# Patient Record
Sex: MALE | Race: WHITE | NOT HISPANIC OR LATINO | URBAN - METROPOLITAN AREA
[De-identification: names, ages, dates, MRNs, and addresses within clinical notes are randomized per-mention and may not be internally consistent; named-entity substitution may affect disease eponyms.]

---

## 2018-02-27 ENCOUNTER — APPOINTMENT (RX ONLY)
Dept: URBAN - METROPOLITAN AREA CLINIC 349 | Facility: CLINIC | Age: 48
Setting detail: DERMATOLOGY
End: 2018-02-27

## 2021-05-05 PROBLEM — R07.2 PRECORDIAL PAIN: Chronic | Status: ACTIVE | Noted: 2021-05-05

## 2021-05-05 PROBLEM — Z82.49 FAMILY HISTORY OF PREMATURE CAD: Chronic | Status: ACTIVE | Noted: 2021-05-05

## 2021-05-05 PROBLEM — I10 ESSENTIAL HYPERTENSION: Status: ACTIVE | Noted: 2021-05-05

## 2021-05-17 ENCOUNTER — HOSPITAL ENCOUNTER (OUTPATIENT)
Dept: CT IMAGING | Age: 51
Discharge: HOME | End: 2021-05-17
Attending: INTERNAL MEDICINE
Payer: COMMERCIAL

## 2021-05-17 VITALS
DIASTOLIC BLOOD PRESSURE: 73 MMHG | HEART RATE: 83 BPM | OXYGEN SATURATION: 98 % | RESPIRATION RATE: 12 BRPM | SYSTOLIC BLOOD PRESSURE: 134 MMHG

## 2021-05-17 DIAGNOSIS — R07.2 PRECORDIAL PAIN: Chronic | ICD-10-CM

## 2021-05-17 DIAGNOSIS — I10 ESSENTIAL HYPERTENSION: ICD-10-CM

## 2021-05-17 DIAGNOSIS — Z82.49 FAMILY HISTORY OF PREMATURE CAD: Chronic | ICD-10-CM

## 2021-05-17 LAB — CREAT BLD-MCNC: 0.92 MG/DL (ref 0.8–1.5)

## 2021-05-17 PROCEDURE — 74011250637 HC RX REV CODE- 250/637: Performed by: PHYSICIAN ASSISTANT

## 2021-05-17 PROCEDURE — 74011000258 HC RX REV CODE- 258: Performed by: INTERNAL MEDICINE

## 2021-05-17 PROCEDURE — 74011000636 HC RX REV CODE- 636: Performed by: INTERNAL MEDICINE

## 2021-05-17 PROCEDURE — 82565 ASSAY OF CREATININE: CPT

## 2021-05-17 PROCEDURE — 75574 CT ANGIO HRT W/3D IMAGE: CPT

## 2021-05-17 RX ORDER — NITROGLYCERIN 0.4 MG/1
0.4 TABLET SUBLINGUAL ONCE
Status: COMPLETED | OUTPATIENT
Start: 2021-05-17 | End: 2021-05-17

## 2021-05-17 RX ORDER — SODIUM CHLORIDE 0.9 % (FLUSH) 0.9 %
10 SYRINGE (ML) INJECTION
Status: COMPLETED | OUTPATIENT
Start: 2021-05-17 | End: 2021-05-17

## 2021-05-17 RX ADMIN — IOPAMIDOL 75 ML: 755 INJECTION, SOLUTION INTRAVENOUS at 07:37

## 2021-05-17 RX ADMIN — NITROGLYCERIN 0.4 MG: 0.4 TABLET SUBLINGUAL at 07:46

## 2021-05-17 RX ADMIN — SODIUM CHLORIDE 100 ML: 900 INJECTION, SOLUTION INTRAVENOUS at 07:37

## 2021-05-17 RX ADMIN — Medication 10 ML: at 07:37

## 2021-05-25 PROCEDURE — 75574 CT ANGIO HRT W/3D IMAGE: CPT | Performed by: INTERNAL MEDICINE

## 2021-05-25 PROCEDURE — 75571 CT HRT W/O DYE W/CA TEST: CPT | Performed by: INTERNAL MEDICINE

## 2021-09-01 ENCOUNTER — HOSPITAL ENCOUNTER (OUTPATIENT)
Dept: LAB | Age: 51
Discharge: HOME | End: 2021-09-01
Payer: COMMERCIAL

## 2021-09-01 DIAGNOSIS — Z82.49 FAMILY HISTORY OF PREMATURE CAD: ICD-10-CM

## 2021-09-01 DIAGNOSIS — I10 ESSENTIAL HYPERTENSION: ICD-10-CM

## 2021-09-01 LAB
ALBUMIN SERPL-MCNC: 4.2 G/DL (ref 3.5–5)
ALBUMIN/GLOB SERPL: 1.4 {RATIO} (ref 1.2–3.5)
ALP SERPL-CCNC: 58 U/L (ref 50–136)
ALT SERPL-CCNC: 50 U/L (ref 12–65)
ANION GAP SERPL CALC-SCNC: 3 MMOL/L (ref 7–16)
AST SERPL-CCNC: 17 U/L (ref 15–37)
BILIRUB SERPL-MCNC: 0.8 MG/DL (ref 0.2–1.1)
BUN SERPL-MCNC: 10 MG/DL (ref 6–23)
CALCIUM SERPL-MCNC: 9.4 MG/DL (ref 8.3–10.4)
CHLORIDE SERPL-SCNC: 108 MMOL/L (ref 98–107)
CHOLEST SERPL-MCNC: 150 MG/DL
CO2 SERPL-SCNC: 27 MMOL/L (ref 21–32)
CREAT SERPL-MCNC: 0.89 MG/DL (ref 0.8–1.5)
GLOBULIN SER CALC-MCNC: 3.1 G/DL (ref 2.3–3.5)
GLUCOSE SERPL-MCNC: 110 MG/DL (ref 65–100)
HDLC SERPL-MCNC: 58 MG/DL (ref 40–60)
HDLC SERPL: 2.6 {RATIO}
LDLC SERPL CALC-MCNC: 67.4 MG/DL
POTASSIUM SERPL-SCNC: 4.6 MMOL/L (ref 3.5–5.1)
PROT SERPL-MCNC: 7.3 G/DL (ref 6.3–8.2)
SODIUM SERPL-SCNC: 138 MMOL/L (ref 138–145)
TRIGL SERPL-MCNC: 123 MG/DL (ref 35–150)
VLDLC SERPL CALC-MCNC: 24.6 MG/DL (ref 6–23)

## 2021-09-01 PROCEDURE — 80053 COMPREHEN METABOLIC PANEL: CPT

## 2021-09-01 PROCEDURE — 80061 LIPID PANEL: CPT

## 2021-09-01 PROCEDURE — 36415 COLL VENOUS BLD VENIPUNCTURE: CPT

## 2021-09-01 NOTE — PROGRESS NOTES
Patient laboratory information looks markedly better. Continue the good work. Blood sugar still remains mildly elevated. Continue to be aggressive with low carbohydrate diet exercise.

## 2022-01-03 ENCOUNTER — HOSPITAL ENCOUNTER (OUTPATIENT)
Age: 52
Setting detail: OBSERVATION
Discharge: HOME | End: 2022-01-04
Attending: EMERGENCY MEDICINE | Admitting: INTERNAL MEDICINE
Payer: COMMERCIAL

## 2022-01-03 ENCOUNTER — APPOINTMENT (OUTPATIENT)
Dept: GENERAL RADIOLOGY | Age: 52
End: 2022-01-03
Attending: EMERGENCY MEDICINE
Payer: COMMERCIAL

## 2022-01-03 DIAGNOSIS — R07.9 CHEST PAIN, UNSPECIFIED TYPE: Primary | ICD-10-CM

## 2022-01-03 DIAGNOSIS — I10 ESSENTIAL HYPERTENSION: ICD-10-CM

## 2022-01-03 DIAGNOSIS — Z82.49 FAMILY HISTORY OF PREMATURE CAD: Chronic | ICD-10-CM

## 2022-01-03 PROBLEM — E87.6 HYPOKALEMIA: Status: ACTIVE | Noted: 2022-01-03

## 2022-01-03 PROBLEM — Z72.0 SMOKELESS TOBACCO USE: Chronic | Status: ACTIVE | Noted: 2022-01-03

## 2022-01-03 LAB
ALBUMIN SERPL-MCNC: 4.2 G/DL (ref 3.5–5)
ALBUMIN/GLOB SERPL: 1.2 {RATIO} (ref 1.2–3.5)
ALP SERPL-CCNC: 66 U/L (ref 50–136)
ALT SERPL-CCNC: 54 U/L (ref 12–65)
ANION GAP SERPL CALC-SCNC: 9 MMOL/L (ref 7–16)
AST SERPL-CCNC: 29 U/L (ref 15–37)
ATRIAL RATE: 128 BPM
BASOPHILS # BLD: 0.1 K/UL (ref 0–0.2)
BASOPHILS NFR BLD: 1 % (ref 0–2)
BILIRUB SERPL-MCNC: 0.7 MG/DL (ref 0.2–1.1)
BUN SERPL-MCNC: 13 MG/DL (ref 6–23)
CALCIUM SERPL-MCNC: 9.6 MG/DL (ref 8.3–10.4)
CALCULATED P AXIS, ECG09: 71 DEGREES
CALCULATED R AXIS, ECG10: 167 DEGREES
CALCULATED T AXIS, ECG11: 66 DEGREES
CHLORIDE SERPL-SCNC: 105 MMOL/L (ref 98–107)
CO2 SERPL-SCNC: 26 MMOL/L (ref 21–32)
CREAT SERPL-MCNC: 1.13 MG/DL (ref 0.8–1.5)
DIAGNOSIS, 93000: NORMAL
DIFFERENTIAL METHOD BLD: ABNORMAL
EOSINOPHIL # BLD: 0.1 K/UL (ref 0–0.8)
EOSINOPHIL NFR BLD: 1 % (ref 0.5–7.8)
ERYTHROCYTE [DISTWIDTH] IN BLOOD BY AUTOMATED COUNT: 12.2 % (ref 11.9–14.6)
GLOBULIN SER CALC-MCNC: 3.5 G/DL (ref 2.3–3.5)
GLUCOSE SERPL-MCNC: 181 MG/DL (ref 65–100)
HCT VFR BLD AUTO: 44.8 % (ref 41.1–50.3)
HGB BLD-MCNC: 15.9 G/DL (ref 13.6–17.2)
IMM GRANULOCYTES # BLD AUTO: 0 K/UL (ref 0–0.5)
IMM GRANULOCYTES NFR BLD AUTO: 1 % (ref 0–5)
LIPASE SERPL-CCNC: 125 U/L (ref 73–393)
LYMPHOCYTES # BLD: 1.7 K/UL (ref 0.5–4.6)
LYMPHOCYTES NFR BLD: 27 % (ref 13–44)
MAGNESIUM SERPL-MCNC: 3.1 MG/DL (ref 1.8–2.4)
MCH RBC QN AUTO: 32.1 PG (ref 26.1–32.9)
MCHC RBC AUTO-ENTMCNC: 35.5 G/DL (ref 31.4–35)
MCV RBC AUTO: 90.5 FL (ref 79.6–97.8)
MONOCYTES # BLD: 0.6 K/UL (ref 0.1–1.3)
MONOCYTES NFR BLD: 10 % (ref 4–12)
NEUTS SEG # BLD: 3.8 K/UL (ref 1.7–8.2)
NEUTS SEG NFR BLD: 61 % (ref 43–78)
NRBC # BLD: 0 K/UL (ref 0–0.2)
P-R INTERVAL, ECG05: 164 MS
PLATELET # BLD AUTO: 174 K/UL (ref 150–450)
PMV BLD AUTO: 10.8 FL (ref 9.4–12.3)
POTASSIUM SERPL-SCNC: 3.2 MMOL/L (ref 3.5–5.1)
PROT SERPL-MCNC: 7.7 G/DL (ref 6.3–8.2)
Q-T INTERVAL, ECG07: 298 MS
QRS DURATION, ECG06: 98 MS
QTC CALCULATION (BEZET), ECG08: 435 MS
RBC # BLD AUTO: 4.95 M/UL (ref 4.23–5.6)
SODIUM SERPL-SCNC: 140 MMOL/L (ref 136–145)
TROPONIN-HIGH SENSITIVITY: 5.5 PG/ML (ref 0–14)
TROPONIN-HIGH SENSITIVITY: 6.4 PG/ML (ref 0–14)
VENTRICULAR RATE, ECG03: 128 BPM
WBC # BLD AUTO: 6.3 K/UL (ref 4.3–11.1)

## 2022-01-03 PROCEDURE — 99285 EMERGENCY DEPT VISIT HI MDM: CPT

## 2022-01-03 PROCEDURE — 83735 ASSAY OF MAGNESIUM: CPT

## 2022-01-03 PROCEDURE — 93005 ELECTROCARDIOGRAM TRACING: CPT | Performed by: EMERGENCY MEDICINE

## 2022-01-03 PROCEDURE — 99220 PR INITIAL OBSERVATION CARE/DAY 70 MINUTES: CPT | Performed by: INTERNAL MEDICINE

## 2022-01-03 PROCEDURE — 74011000250 HC RX REV CODE- 250: Performed by: PHYSICIAN ASSISTANT

## 2022-01-03 PROCEDURE — G0378 HOSPITAL OBSERVATION PER HR: HCPCS

## 2022-01-03 PROCEDURE — 84484 ASSAY OF TROPONIN QUANT: CPT

## 2022-01-03 PROCEDURE — 74011250637 HC RX REV CODE- 250/637: Performed by: PHYSICIAN ASSISTANT

## 2022-01-03 PROCEDURE — 80053 COMPREHEN METABOLIC PANEL: CPT

## 2022-01-03 PROCEDURE — 71046 X-RAY EXAM CHEST 2 VIEWS: CPT

## 2022-01-03 PROCEDURE — 74011250637 HC RX REV CODE- 250/637: Performed by: EMERGENCY MEDICINE

## 2022-01-03 PROCEDURE — 83690 ASSAY OF LIPASE: CPT

## 2022-01-03 PROCEDURE — 85025 COMPLETE CBC W/AUTO DIFF WBC: CPT

## 2022-01-03 RX ORDER — GUAIFENESIN 100 MG/5ML
81 LIQUID (ML) ORAL DAILY
Status: DISCONTINUED | OUTPATIENT
Start: 2022-01-04 | End: 2022-01-04 | Stop reason: SDUPTHER

## 2022-01-03 RX ORDER — SODIUM CHLORIDE 0.9 % (FLUSH) 0.9 %
5-40 SYRINGE (ML) INJECTION AS NEEDED
Status: DISCONTINUED | OUTPATIENT
Start: 2022-01-03 | End: 2022-01-04 | Stop reason: HOSPADM

## 2022-01-03 RX ORDER — ROSUVASTATIN CALCIUM 20 MG/1
40 TABLET, COATED ORAL
Status: DISCONTINUED | OUTPATIENT
Start: 2022-01-03 | End: 2022-01-04 | Stop reason: HOSPADM

## 2022-01-03 RX ORDER — ACETAMINOPHEN 325 MG/1
650 TABLET ORAL
Status: DISCONTINUED | OUTPATIENT
Start: 2022-01-03 | End: 2022-01-04 | Stop reason: HOSPADM

## 2022-01-03 RX ORDER — METOPROLOL TARTRATE 25 MG/1
25 TABLET, FILM COATED ORAL 2 TIMES DAILY
Status: DISCONTINUED | OUTPATIENT
Start: 2022-01-03 | End: 2022-01-04

## 2022-01-03 RX ORDER — SODIUM CHLORIDE 9 MG/ML
100 INJECTION, SOLUTION INTRAVENOUS CONTINUOUS
Status: DISCONTINUED | OUTPATIENT
Start: 2022-01-04 | End: 2022-01-04 | Stop reason: HOSPADM

## 2022-01-03 RX ORDER — LORAZEPAM 0.5 MG/1
0.5 TABLET ORAL
Status: DISCONTINUED | OUTPATIENT
Start: 2022-01-03 | End: 2022-01-04 | Stop reason: HOSPADM

## 2022-01-03 RX ORDER — SODIUM CHLORIDE 0.9 % (FLUSH) 0.9 %
5-40 SYRINGE (ML) INJECTION EVERY 8 HOURS
Status: DISCONTINUED | OUTPATIENT
Start: 2022-01-03 | End: 2022-01-04 | Stop reason: HOSPADM

## 2022-01-03 RX ORDER — NITROGLYCERIN 0.4 MG/1
0.4 TABLET SUBLINGUAL
Status: DISCONTINUED | OUTPATIENT
Start: 2022-01-03 | End: 2022-01-04 | Stop reason: HOSPADM

## 2022-01-03 RX ORDER — SODIUM CHLORIDE 0.9 % (FLUSH) 0.9 %
5-10 SYRINGE (ML) INJECTION EVERY 8 HOURS
Status: DISCONTINUED | OUTPATIENT
Start: 2022-01-03 | End: 2022-01-04 | Stop reason: HOSPADM

## 2022-01-03 RX ORDER — SODIUM CHLORIDE 0.9 % (FLUSH) 0.9 %
5-10 SYRINGE (ML) INJECTION AS NEEDED
Status: DISCONTINUED | OUTPATIENT
Start: 2022-01-03 | End: 2022-01-04 | Stop reason: HOSPADM

## 2022-01-03 RX ORDER — LISINOPRIL 5 MG/1
10 TABLET ORAL DAILY
Status: DISCONTINUED | OUTPATIENT
Start: 2022-01-04 | End: 2022-01-04 | Stop reason: HOSPADM

## 2022-01-03 RX ORDER — MORPHINE SULFATE 2 MG/ML
2 INJECTION, SOLUTION INTRAMUSCULAR; INTRAVENOUS
Status: DISCONTINUED | OUTPATIENT
Start: 2022-01-03 | End: 2022-01-04 | Stop reason: HOSPADM

## 2022-01-03 RX ORDER — GUAIFENESIN 100 MG/5ML
324 LIQUID (ML) ORAL
Status: COMPLETED | OUTPATIENT
Start: 2022-01-03 | End: 2022-01-03

## 2022-01-03 RX ORDER — ONDANSETRON 2 MG/ML
4 INJECTION INTRAMUSCULAR; INTRAVENOUS
Status: DISCONTINUED | OUTPATIENT
Start: 2022-01-03 | End: 2022-01-04 | Stop reason: HOSPADM

## 2022-01-03 RX ADMIN — SODIUM CHLORIDE, PRESERVATIVE FREE 5 ML: 5 INJECTION INTRAVENOUS at 16:23

## 2022-01-03 RX ADMIN — ASPIRIN 243 MG: 81 TABLET, CHEWABLE ORAL at 15:40

## 2022-01-03 RX ADMIN — ROSUVASTATIN CALCIUM 40 MG: 20 TABLET, FILM COATED ORAL at 22:47

## 2022-01-03 RX ADMIN — NITROGLYCERIN 1 INCH: 20 OINTMENT TOPICAL at 15:40

## 2022-01-03 NOTE — ED TRIAGE NOTES
Pt ambulatory to triage with sudden onset left sided CP reports nausea and radiating pain across chest and some nausea. Reports pain was \"hot pain\" reports cardiac hx \"of some blockages\" denies stents.  Follows with [facility] cardiology

## 2022-01-03 NOTE — H&P
[facility] Cardiology History & Physical      Date of  Admission: 1/3/2022  3:10 PM     Primary Care Physician: Dr. Halle Zelaya  Primary Cardiologist: Dr. Heidy Watson  Referring Physician: Dr. Leigha Odonnell  Admitting Physician: Dr. Yolis Valdez    CC: Chest pain    HPI:  Aaliyah Osuna is a 49 y.o. WM with h/o HTN and dyslipidemia who presented to ED with c/o acute onset of left-sided chest pain. He reports CP w/o radiation with associated nausea, heart racing, felt anxious and diaphoretic. Denies SOB, vomiting, LE swelling or syncope. Pt states he had a CT Ca score which was elevated and records show it was 379. Pt's father had CABG in early 63's and DF had MI at 53 yo. In the ED, BP elevated initially at 171/112, then repeat 150/99. HS troponin is negative at 5.5, CXR unremarkable and ECG showed ST with IC RBBB and no acute ST-T wave changes. Cardiology asked to evaluate for admission. Pt had NTG paste placed in ED and is feeling better w/o current CP. Non smoker but uses smokeless tobacco daily. History reviewed. No pertinent past medical history. Past Surgical History:   Procedure Laterality Date    HX OTHER SURGICAL      BIRTH BOBBY REMOVED ON NECK    HX WISDOM TEETH EXTRACTION         No Known Allergies   Social History     Socioeconomic History    Marital status:      Spouse name: Not on file    Number of children: Not on file    Years of education: Not on file    Highest education level: Not on file   Occupational History    Not on file   Tobacco Use    Smoking status: Never Smoker    Smokeless tobacco: Current User   Substance and Sexual Activity    Alcohol use:  Yes     Alcohol/week: 5.0 standard drinks     Types: 5 Cans of beer per week    Drug use: Not on file    Sexual activity: Not on file   Other Topics Concern    Not on file   Social History Narrative    Not on file     Social Determinants of Health     Financial Resource Strain:     Difficulty of Paying Living Expenses: Not on file   Food Insecurity:     Worried About Running Out of Food in the Last Year: Not on file    Uma of Food in the Last Year: Not on file   Transportation Needs:     Lack of Transportation (Medical): Not on file    Lack of Transportation (Non-Medical): Not on file   Physical Activity:     Days of Exercise per Week: Not on file    Minutes of Exercise per Session: Not on file   Stress:     Feeling of Stress : Not on file   Social Connections:     Frequency of Communication with Friends and Family: Not on file    Frequency of Social Gatherings with Friends and Family: Not on file    Attends Muslim Services: Not on file    Active Member of [address] [place] [address] [employer] or Organizations: Not on file    Attends Club or Organization Meetings: Not on file    Marital Status: Not on file   Intimate Partner Violence:     Fear of Current or Ex-Partner: Not on file    Emotionally Abused: Not on file    Physically Abused: Not on file    Sexually Abused: Not on file   Housing Stability:     Unable to Pay for Housing in the Last Year: Not on file    Number of [place] in the Last Year: Not on file    Unstable Housing in the Last Year: Not on file     Family History   Problem Relation Age of Onset    No Known Problems Mother     Diabetes Father     Heart Disease Father         Current Facility-Administered Medications   Medication Dose Route Frequency    sodium chloride (NS) flush 5-10 mL  5-10 mL IntraVENous Q8H    sodium chloride (NS) flush 5-10 mL  5-10 mL IntraVENous PRN     Current Outpatient Medications   Medication Sig    rosuvastatin (CRESTOR) 40 mg tablet Take 1 Tablet by mouth nightly.     lisinopril (PRINIVIL, ZESTRIL) 10 mg tablet TAKE 1 TABLET BY MOUTH DAILY       Review of symptoms:  General: no recent weight loss/gain, weakness, fatigue, fever or chills   Skin: no rashes, lumps, or other skin changes   HEENT: no headache, dizziness, lightheadedness, vision changes, hearing changes, tinnitus, vertigo, sinus pressure/pain, bleeding gums, sore throat, or hoarseness   Neck: no swollen glands, goiter, pain or stiffness   Respiratory: no cough, sputum, hemoptysis, dyspnea, wheezing   Cardiovascular: +chest pain or discomfort, +palpitations, no dyspnea, orthopnea, paroxysmal nocturnal dyspnea, peripheral edema   Gastrointestinal: no trouble swallowing, heartburn, change of appetite, +nausea, no change in bowel habits, pain with defecation, rectal bleeding or black/tarry stools, hemorrhoids, constipation, diarrhea, abdominal pain, jaundice, liver or gallbladder problems   Urinary: no frequency, urgency , hematuria, burning/pain with urination, recent flank pain, polyuria, nocturia, or difficulty urinating   Peripheral Vascular: no claudication, leg cramps, prior DVTs, swelling of calves, legs, or feet, color change, or swelling with redness or tenderness   Musculoskeletal: no muscle or joint pain/stiffness, joint swelling, erythema of joints, or back pain   Psychiatric: no depression, mental disorders, or excessive stress   Neurological: no history of CVA, dizziness, no sensory or motor loss, seizures, syncope, tremors, numbness, tingling, no changes in mood, attention, or speech, no changes in orientation, memory, insight, or judgment. no headache, vertigo. Hematologic: no anemia, easy bruising or bleeding   Endocrine: no diabetes, thyroid problems, heat or cold intolerance, excessive sweating, polyuria, polydipsia      Subjective:   Physical Exam  Visit Vitals  BP (!) 150/99   Pulse 89   Temp 98.1 °F (36.7 °C)   Resp 18   Ht 5' 10\" (1.778 m)   Wt 83.9 kg (185 lb)   SpO2 100%   BMI 26.54 kg/m²     General Appearance:  Well developed, well nourished, alert and oriented x 3, and individual in no acute distress. Ears/Nose/Mouth/Throat:   Hearing grossly normal.         Neck: Supple. Chest:   Lungs clear to auscultation bilaterally. Cardiovascular:  Regular rate and rhythm, S1, S2 normal, no murmur.    Abdomen:   Soft, non-tender, bowel sounds are active. Extremities: No edema bilaterally. Skin: Warm and dry. Labs:   Recent Results (from the past 24 hour(s))   EKG, 12 LEAD, INITIAL    Collection Time: 01/03/22  2:15 PM   Result Value Ref Range    Ventricular Rate 128 BPM    Atrial Rate 128 BPM    P-R Interval 164 ms    QRS Duration 98 ms    Q-T Interval 298 ms    QTC Calculation (Bezet) 435 ms    Calculated P Axis 71 degrees    Calculated R Axis 167 degrees    Calculated T Axis 66 degrees    Diagnosis       Sinus tachycardia with Premature supraventricular complexes  Possible Left atrial enlargement  Right axis deviation  Incomplete right bundle branch block  Cannot rule out Anterior infarct , age undetermined  Abnormal ECG  No previous ECGs available     TROPONIN-HIGH SENSITIVITY    Collection Time: 01/03/22  2:26 PM   Result Value Ref Range    Troponin-High Sensitivity 5.5 0 - 14 pg/mL   CBC WITH AUTOMATED DIFF    Collection Time: 01/03/22  2:26 PM   Result Value Ref Range    WBC 6.3 4.3 - 11.1 K/uL    RBC 4.95 4.23 - 5.6 M/uL    HGB 15.9 13.6 - 17.2 g/dL    HCT 44.8 41.1 - 50.3 %    MCV 90.5 79.6 - 97.8 FL    MCH 32.1 26.1 - 32.9 PG    MCHC 35.5 (H) 31.4 - 35.0 g/dL    RDW 12.2 11.9 - 14.6 %    PLATELET 697 998 - 530 K/uL    MPV 10.8 9.4 - 12.3 FL    ABSOLUTE NRBC 0.00 0.0 - 0.2 K/uL    DF AUTOMATED      NEUTROPHILS 61 43 - 78 %    LYMPHOCYTES 27 13 - 44 %    MONOCYTES 10 4.0 - 12.0 %    EOSINOPHILS 1 0.5 - 7.8 %    BASOPHILS 1 0.0 - 2.0 %    IMMATURE GRANULOCYTES 1 0.0 - 5.0 %    ABS. NEUTROPHILS 3.8 1.7 - 8.2 K/UL    ABS. LYMPHOCYTES 1.7 0.5 - 4.6 K/UL    ABS. MONOCYTES 0.6 0.1 - 1.3 K/UL    ABS. EOSINOPHILS 0.1 0.0 - 0.8 K/UL    ABS. BASOPHILS 0.1 0.0 - 0.2 K/UL    ABS. IMM.  GRANS. 0.0 0.0 - 0.5 K/UL   METABOLIC PANEL, COMPREHENSIVE    Collection Time: 01/03/22  2:26 PM   Result Value Ref Range    Sodium 140 136 - 145 mmol/L    Potassium 3.2 (L) 3.5 - 5.1 mmol/L    Chloride 105 98 - 107 mmol/L    CO2 26 21 - 32 mmol/L    Anion gap 9 7 - 16 mmol/L    Glucose 181 (H) 65 - 100 mg/dL    BUN 13 6 - 23 MG/DL    Creatinine 1.13 0.8 - 1.5 MG/DL    GFR est AA >60 >60 ml/min/1.73m2    GFR est non-AA >60 >60 ml/min/1.73m2    Calcium 9.6 8.3 - 10.4 MG/DL    Bilirubin, total 0.7 0.2 - 1.1 MG/DL    ALT (SGPT) 54 12 - 65 U/L    AST (SGOT) 29 15 - 37 U/L    Alk.  phosphatase 66 50 - 136 U/L    Protein, total 7.7 6.3 - 8.2 g/dL    Albumin 4.2 3.5 - 5.0 g/dL    Globulin 3.5 2.3 - 3.5 g/dL    A-G Ratio 1.2 1.2 - 3.5     LIPASE    Collection Time: 01/03/22  2:26 PM   Result Value Ref Range    Lipase 125 73 - 393 U/L   MAGNESIUM    Collection Time: 01/03/22  2:26 PM   Result Value Ref Range    Magnesium 3.1 (H) 1.8 - 2.4 mg/dL       Pt has been seen and examined by Dr. Jamil Grady and he agrees with the following assessment and plan:     Assessment/Plan:        Diagnosis    Chest pain with recent elevated Ca score 379- admit to telemetry, r/o MI with serial CE's x 3, ASA, add BB, continue home ACE-I and statin, add NTG paste, check echo and plan LHC in AM with Dr. Josette Bland, encouraged cessation of smokeless tobacco    Essential hypertension- uncontrolled, continue ACE-I, add BB, NTG paste    Dyslipidemia- statin    Hypokalemia- replace, recheck in AM    Family history of premature CAD       Radha Philip PA-C

## 2022-01-03 NOTE — ED PROVIDER NOTES
Patient is a 81-year-old male presenting to the emergency department today complaining of chest pain in the left chest which started 2 PM this afternoon. The patient said he was just working in his office when it started. He described the pain as a hot pain in the left chest without radiation. He says it is 10 out of 10 initially. He said that last for about 15 minutes and decreased to 7 out of 10 but is still present. Patient tells me that in June he had a CT which showed 60% blockage in the  maker. He has a family history of heart disease. He denies any tobacco or recreational drug use but drinks a glass of wine daily. The patient was short of breath with onset of his symptoms but no other associated symptoms noted. History reviewed. No pertinent past medical history. Past Surgical History:   Procedure Laterality Date    HX OTHER SURGICAL      BIRTH BOBBY REMOVED ON NECK    HX WISDOM TEETH EXTRACTION           Family History:   Problem Relation Age of Onset    No Known Problems Mother     Diabetes Father     Heart Disease Father        Social History     Socioeconomic History    Marital status:      Spouse name: Not on file    Number of children: Not on file    Years of education: Not on file    Highest education level: Not on file   Occupational History    Not on file   Tobacco Use    Smoking status: Never Smoker    Smokeless tobacco: Current User   Substance and Sexual Activity    Alcohol use:  Yes     Alcohol/week: 5.0 standard drinks     Types: 5 Cans of beer per week    Drug use: Not on file    Sexual activity: Not on file   Other Topics Concern    Not on file   Social History Narrative    Not on file     Social Determinants of Health     Financial Resource Strain:     Difficulty of Paying Living Expenses: Not on file   Food Insecurity:     Worried About Running Out of Food in the Last Year: Not on file    Uma of Food in the Last Year: Not on file Transportation Needs:     Lack of Transportation (Medical): Not on file    Lack of Transportation (Non-Medical): Not on file   Physical Activity:     Days of Exercise per Week: Not on file    Minutes of Exercise per Session: Not on file   Stress:     Feeling of Stress : Not on file   Social Connections:     Frequency of Communication with Friends and Family: Not on file    Frequency of Social Gatherings with Friends and Family: Not on file    Attends [employer] Services: Not on file    Active Member of [address] [place] [address] or Organizations: Not on file    Attends Club or Organization Meetings: Not on file    Marital Status: Not on file   Intimate Partner Violence:     Fear of Current or Ex-Partner: Not on file    Emotionally Abused: Not on file    Physically Abused: Not on file    Sexually Abused: Not on file   Housing Stability:     Unable to Pay for Housing in the Last Year: Not on file    Number of [place] in the Last Year: Not on file    Unstable Housing in the Last Year: Not on file         ALLERGIES: Patient has no known allergies. Review of Systems   Cardiovascular: Positive for chest pain. All other systems reviewed and are negative. Vitals:    01/03/22 1416 01/03/22 1510   BP: (!) 171/112 (!) 150/99   Pulse: (!) 119 89   Resp: 22 18   Temp: 98.1 °F (36.7 °C)    SpO2: 100% 100%   Weight: 83.9 kg (185 lb)    Height: 5' 10\" (1.778 m)             Physical Exam     GENERAL:The patient has Body mass index is 26.54 kg/m². Well-hydrated. VITAL SIGNS: Heart rate, blood pressure, respiratory rate reviewed as recorded in  nurse's notes  EYES: Pupils reactive. Extraocular motion intact. No conjunctival redness or drainage. NECK: Supple, no meningeal signs. Trachea midline. No masses or thyromegaly. LUNGS: Breath sounds clear and equal bilaterally no accessory muscle use. CHEST: No deformity  CARDIOVASCULAR: Regular rate and rhythm  ABDOMEN: Soft without tenderness.  No palpable masses or organomegaly. No  peritoneal signs. No rigidity. EXTREMITIES: No clubbing or cyanosis. No joint swelling. Normal muscle tone. No  restricted range of motion appreciated. NEUROLOGIC: Sensation is grossly intact. Cranial nerve exam reveals face is  symmetrical, tongue is midline speech is clear. SKIN: No rash or petechiae. Good skin turgor palpated. PSYCHIATRIC: Alert and oriented. Appropriate behavior and judgment. MDM  Number of Diagnoses or Management Options  Diagnosis management comments: CHF, COPD, pneumonia, PE,    MI, coronary artery disease, unstable angina, coronary artery disease,    Atrial fibrillation, cardiac arrhythmia, PVC, medication induced palpitations, heart block,  electrolyte induced palpitations,    Aortic dissection, aortic aneurysm,    GERD, musculoskeletal pain, costochondritis, rib fracture, pleurisy,         Amount and/or Complexity of Data Reviewed  Clinical lab tests: reviewed and ordered  Tests in the radiology section of CPT®: reviewed and ordered  Tests in the medicine section of CPT®: ordered and reviewed  Obtain history from someone other than the patient: yes  Review and summarize past medical records: yes  Discuss the patient with other providers: yes  Independent visualization of images, tracings, or specimens: yes      ED Course as of 01/03/22 1620   Mon Jan 03, 2022   1542 XR Chest PA LAT (check if patient is in hallway or waiting room)  IMPRESSION  No radiographic evidence of acute cardiopulmonary disease. [OI]   1510 Cardiology was consulted and came to the emergency department to evaluate the patient. They are planning on admitting him to the hospital and doing a heart catheterization tomorrow. [IT]   5953 I talked to the patient and his wife about the findings in the emergency department. Cardiology was consulted and came to see the patient.  [KH]      ED Course User Index  [KH] Amena Keller DO       EKG    Date/Time: 1/3/2022 3:38 PM  Performed by: Kevin Regalado DO KRYSTAL  Authorized by: Kayode Teran DO     ECG reviewed by ED Physician in the absence of a cardiologist: yes    Comments:      Sinus tachycardia rate of 128 bpm..  No ST elevation appreciated. EKG    Date/Time: 1/3/2022 4:20 PM  Performed by: Kayode Teran DO  Authorized by: Kayode Teran DO     ECG reviewed by ED Physician in the absence of a cardiologist: yes    Comments:      Normal sinus rhythm rate of 90 bpm with no ST elevation appreciated.

## 2022-01-04 ENCOUNTER — HOSPITAL ENCOUNTER (OUTPATIENT)
Dept: NON INVASIVE DIAGNOSTICS | Age: 52
Setting detail: OBSERVATION
Discharge: HOME | End: 2022-01-04
Attending: PHYSICIAN ASSISTANT
Payer: COMMERCIAL

## 2022-01-04 VITALS
HEART RATE: 83 BPM | RESPIRATION RATE: 15 BRPM | BODY MASS INDEX: 26.48 KG/M2 | HEIGHT: 70 IN | WEIGHT: 185 LBS | TEMPERATURE: 98.1 F | DIASTOLIC BLOOD PRESSURE: 83 MMHG | SYSTOLIC BLOOD PRESSURE: 130 MMHG | OXYGEN SATURATION: 99 %

## 2022-01-04 VITALS
BODY MASS INDEX: 26.48 KG/M2 | SYSTOLIC BLOOD PRESSURE: 150 MMHG | WEIGHT: 185 LBS | DIASTOLIC BLOOD PRESSURE: 99 MMHG | HEIGHT: 70 IN

## 2022-01-04 LAB
ACT BLD: 273 SECS (ref 70–128)
ANION GAP SERPL CALC-SCNC: 5 MMOL/L (ref 7–16)
BUN SERPL-MCNC: 12 MG/DL (ref 6–23)
CALCIUM SERPL-MCNC: 9 MG/DL (ref 8.3–10.4)
CHLORIDE SERPL-SCNC: 106 MMOL/L (ref 98–107)
CHOLEST SERPL-MCNC: 146 MG/DL
CO2 SERPL-SCNC: 27 MMOL/L (ref 21–32)
CREAT SERPL-MCNC: 0.96 MG/DL (ref 0.8–1.5)
ECHO AO ASC DIAM: 3 CM
ECHO AO ASCENDING AORTA INDEX: 1.49 CM/M2
ECHO AV AREA PEAK VELOCITY: 3.3 CM2
ECHO AV AREA VTI: 3.4 CM2
ECHO AV AREA/BSA PEAK VELOCITY: 1.6 CM2/M2
ECHO AV AREA/BSA VTI: 1.7 CM2/M2
ECHO AV CUSP MM: 2.2 CM
ECHO AV MEAN GRADIENT: 4 MMHG
ECHO AV MEAN VELOCITY: 0.9 M/S
ECHO AV PEAK GRADIENT: 8 MMHG
ECHO AV PEAK VELOCITY: 1.4 M/S
ECHO AV VELOCITY RATIO: 0.79
ECHO AV VTI: 27.4 CM
ECHO EST RA PRESSURE: 3 MMHG
ECHO LA AREA 2C: 15.7 CM2
ECHO LA AREA 4C: 18.6 CM2
ECHO LA DIAMETER INDEX: 1.49 CM/M2
ECHO LA DIAMETER: 3 CM
ECHO LA MAJOR AXIS: 5.4 CM
ECHO LA MINOR AXIS: 4.8 CM
ECHO LA VOL BP: 46 ML (ref 18–58)
ECHO LA VOL/BSA BIPLANE: 23 ML/M2 (ref 16–34)
ECHO LV E' LATERAL VELOCITY: 13 CM/S
ECHO LV E' SEPTAL VELOCITY: 9 CM/S
ECHO LV EDV A4C: 97 ML
ECHO LV EDV INDEX A4C: 48 ML/M2
ECHO LV EJECTION FRACTION A4C: 61 %
ECHO LV ESV A4C: 37 ML
ECHO LV ESV INDEX A4C: 18 ML/M2
ECHO LV FRACTIONAL SHORTENING: 31 % (ref 28–44)
ECHO LV INTERNAL DIMENSION DIASTOLE INDEX: 2.23 CM/M2
ECHO LV INTERNAL DIMENSION DIASTOLIC: 4.5 CM (ref 4.2–5.9)
ECHO LV INTERNAL DIMENSION SYSTOLIC INDEX: 1.53 CM/M2
ECHO LV INTERNAL DIMENSION SYSTOLIC: 3.1 CM
ECHO LV IVSD: 1 CM (ref 0.6–1)
ECHO LV MASS 2D: 153.3 G (ref 88–224)
ECHO LV MASS INDEX 2D: 75.9 G/M2 (ref 49–115)
ECHO LV POSTERIOR WALL DIASTOLIC: 1 CM (ref 0.6–1)
ECHO LV RELATIVE WALL THICKNESS RATIO: 0.44
ECHO LVOT AREA: 4.5 CM2
ECHO LVOT AV VTI INDEX: 0.77
ECHO LVOT DIAM: 2.4 CM
ECHO LVOT MEAN GRADIENT: 2 MMHG
ECHO LVOT PEAK GRADIENT: 5 MMHG
ECHO LVOT PEAK VELOCITY: 1.1 M/S
ECHO LVOT STROKE VOLUME INDEX: 47.5 ML/M2
ECHO LVOT SV: 95.9 ML
ECHO LVOT VTI: 21.2 CM
ECHO MV A VELOCITY: 0.66 M/S
ECHO MV AREA VTI: 4.1 CM2
ECHO MV E DECELERATION TIME (DT): 190 MS
ECHO MV E VELOCITY: 0.88 M/S
ECHO MV E/A RATIO: 1.33
ECHO MV E/E' LATERAL: 6.77
ECHO MV E/E' RATIO (AVERAGED): 8.27
ECHO MV E/E' SEPTAL: 9.78
ECHO MV LVOT VTI INDEX: 1.11
ECHO MV MAX VELOCITY: 1 M/S
ECHO MV MEAN GRADIENT: 2 MMHG
ECHO MV MEAN VELOCITY: 0.6 M/S
ECHO MV PEAK GRADIENT: 4 MMHG
ECHO MV VTI: 23.6 CM
ECHO RV INTERNAL DIMENSION: 2.6 CM
ECHO RV TAPSE: 2.5 CM (ref 1.5–2)
ERYTHROCYTE [DISTWIDTH] IN BLOOD BY AUTOMATED COUNT: 12.4 % (ref 11.9–14.6)
GLUCOSE SERPL-MCNC: 117 MG/DL (ref 65–100)
HCT VFR BLD AUTO: 40 % (ref 41.1–50.3)
HDLC SERPL-MCNC: 53 MG/DL (ref 40–60)
HDLC SERPL: 2.8 {RATIO}
HGB BLD-MCNC: 14 G/DL (ref 13.6–17.2)
LDLC SERPL CALC-MCNC: 66.6 MG/DL
MCH RBC QN AUTO: 32.5 PG (ref 26.1–32.9)
MCHC RBC AUTO-ENTMCNC: 35 G/DL (ref 31.4–35)
MCV RBC AUTO: 92.8 FL (ref 79.6–97.8)
NRBC # BLD: 0 K/UL (ref 0–0.2)
PLATELET # BLD AUTO: 132 K/UL (ref 150–450)
PMV BLD AUTO: 10.9 FL (ref 9.4–12.3)
POTASSIUM SERPL-SCNC: 4 MMOL/L (ref 3.5–5.1)
RBC # BLD AUTO: 4.31 M/UL (ref 4.23–5.6)
SODIUM SERPL-SCNC: 138 MMOL/L (ref 138–145)
TRIGL SERPL-MCNC: 132 MG/DL (ref 35–150)
TROPONIN-HIGH SENSITIVITY: 8.2 PG/ML (ref 0–14)
VLDLC SERPL CALC-MCNC: 26.4 MG/DL (ref 6–23)
WBC # BLD AUTO: 4.7 K/UL (ref 4.3–11.1)

## 2022-01-04 PROCEDURE — C8929 TTE W OR WO FOL WCON,DOPPLER: HCPCS

## 2022-01-04 PROCEDURE — 77030004558 HC CATH ANGI DX SUPR TORQ CARD -A: Performed by: INTERNAL MEDICINE

## 2022-01-04 PROCEDURE — 77030015766: Performed by: INTERNAL MEDICINE

## 2022-01-04 PROCEDURE — C1887 CATHETER, GUIDING: HCPCS | Performed by: INTERNAL MEDICINE

## 2022-01-04 PROCEDURE — 93458 L HRT ARTERY/VENTRICLE ANGIO: CPT | Performed by: INTERNAL MEDICINE

## 2022-01-04 PROCEDURE — 74011250636 HC RX REV CODE- 250/636: Performed by: PHYSICIAN ASSISTANT

## 2022-01-04 PROCEDURE — 80061 LIPID PANEL: CPT

## 2022-01-04 PROCEDURE — 74011000250 HC RX REV CODE- 250: Performed by: PHYSICIAN ASSISTANT

## 2022-01-04 PROCEDURE — C1769 GUIDE WIRE: HCPCS | Performed by: INTERNAL MEDICINE

## 2022-01-04 PROCEDURE — 99152 MOD SED SAME PHYS/QHP 5/>YRS: CPT | Performed by: INTERNAL MEDICINE

## 2022-01-04 PROCEDURE — 85027 COMPLETE CBC AUTOMATED: CPT

## 2022-01-04 PROCEDURE — 74011250637 HC RX REV CODE- 250/637: Performed by: PHYSICIAN ASSISTANT

## 2022-01-04 PROCEDURE — 99153 MOD SED SAME PHYS/QHP EA: CPT | Performed by: INTERNAL MEDICINE

## 2022-01-04 PROCEDURE — C1894 INTRO/SHEATH, NON-LASER: HCPCS | Performed by: INTERNAL MEDICINE

## 2022-01-04 PROCEDURE — 74011000636 HC RX REV CODE- 636: Performed by: INTERNAL MEDICINE

## 2022-01-04 PROCEDURE — 77030042317 HC BND COMPR HEMSTAT -B: Performed by: INTERNAL MEDICINE

## 2022-01-04 PROCEDURE — 80048 BASIC METABOLIC PNL TOTAL CA: CPT

## 2022-01-04 PROCEDURE — 99217 PR OBSERVATION CARE DISCHARGE MANAGEMENT: CPT | Performed by: INTERNAL MEDICINE

## 2022-01-04 PROCEDURE — 74011250636 HC RX REV CODE- 250/636: Performed by: INTERNAL MEDICINE

## 2022-01-04 PROCEDURE — 74011000250 HC RX REV CODE- 250: Performed by: INTERNAL MEDICINE

## 2022-01-04 PROCEDURE — 84484 ASSAY OF TROPONIN QUANT: CPT

## 2022-01-04 PROCEDURE — 85347 COAGULATION TIME ACTIVATED: CPT

## 2022-01-04 PROCEDURE — G0378 HOSPITAL OBSERVATION PER HR: HCPCS

## 2022-01-04 PROCEDURE — 77030012468 HC VLV BLEEDBK CNTRL ABBT -B: Performed by: INTERNAL MEDICINE

## 2022-01-04 PROCEDURE — 93571 IV DOP VEL&/PRESS C FLO 1ST: CPT | Performed by: INTERNAL MEDICINE

## 2022-01-04 RX ORDER — GUAIFENESIN 100 MG/5ML
81 LIQUID (ML) ORAL DAILY
Status: DISCONTINUED | OUTPATIENT
Start: 2022-01-04 | End: 2022-01-04

## 2022-01-04 RX ORDER — GUAIFENESIN 100 MG/5ML
81 LIQUID (ML) ORAL DAILY
Qty: 30 TABLET | Refills: 0 | Status: SHIPPED | COMMUNITY
Start: 2022-01-05

## 2022-01-04 RX ORDER — SODIUM CHLORIDE 0.9 % (FLUSH) 0.9 %
5-40 SYRINGE (ML) INJECTION EVERY 8 HOURS
Status: DISCONTINUED | OUTPATIENT
Start: 2022-01-04 | End: 2022-01-04 | Stop reason: HOSPADM

## 2022-01-04 RX ORDER — GUAIFENESIN 100 MG/5ML
81 LIQUID (ML) ORAL DAILY
Status: DISCONTINUED | OUTPATIENT
Start: 2022-01-05 | End: 2022-01-04 | Stop reason: HOSPADM

## 2022-01-04 RX ORDER — PANTOPRAZOLE SODIUM 40 MG/1
40 TABLET, DELAYED RELEASE ORAL DAILY
Qty: 90 TABLET | Refills: 3 | Status: SHIPPED | OUTPATIENT
Start: 2022-01-04

## 2022-01-04 RX ORDER — LIDOCAINE HYDROCHLORIDE 10 MG/ML
INJECTION INFILTRATION; PERINEURAL AS NEEDED
Status: DISCONTINUED | OUTPATIENT
Start: 2022-01-04 | End: 2022-01-04 | Stop reason: HOSPADM

## 2022-01-04 RX ORDER — SODIUM CHLORIDE 0.9 % (FLUSH) 0.9 %
5-40 SYRINGE (ML) INJECTION AS NEEDED
Status: DISCONTINUED | OUTPATIENT
Start: 2022-01-04 | End: 2022-01-04 | Stop reason: HOSPADM

## 2022-01-04 RX ORDER — HEPARIN SODIUM 10000 [USP'U]/ML
INJECTION, SOLUTION INTRAVENOUS; SUBCUTANEOUS AS NEEDED
Status: DISCONTINUED | OUTPATIENT
Start: 2022-01-04 | End: 2022-01-04 | Stop reason: HOSPADM

## 2022-01-04 RX ORDER — FENTANYL CITRATE 50 UG/ML
INJECTION, SOLUTION INTRAMUSCULAR; INTRAVENOUS AS NEEDED
Status: DISCONTINUED | OUTPATIENT
Start: 2022-01-04 | End: 2022-01-04 | Stop reason: HOSPADM

## 2022-01-04 RX ORDER — PANTOPRAZOLE SODIUM 40 MG/1
40 TABLET, DELAYED RELEASE ORAL
Status: DISCONTINUED | OUTPATIENT
Start: 2022-01-05 | End: 2022-01-04 | Stop reason: HOSPADM

## 2022-01-04 RX ORDER — HEPARIN SODIUM 200 [USP'U]/100ML
INJECTION, SOLUTION INTRAVENOUS
Status: COMPLETED | OUTPATIENT
Start: 2022-01-04 | End: 2022-01-04

## 2022-01-04 RX ORDER — MIDAZOLAM HYDROCHLORIDE 1 MG/ML
INJECTION, SOLUTION INTRAMUSCULAR; INTRAVENOUS AS NEEDED
Status: DISCONTINUED | OUTPATIENT
Start: 2022-01-04 | End: 2022-01-04 | Stop reason: HOSPADM

## 2022-01-04 RX ORDER — SODIUM CHLORIDE 9 MG/ML
250 INJECTION, SOLUTION INTRAVENOUS CONTINUOUS
Status: ACTIVE | OUTPATIENT
Start: 2022-01-04 | End: 2022-01-04

## 2022-01-04 RX ADMIN — SODIUM CHLORIDE 100 ML/HR: 900 INJECTION, SOLUTION INTRAVENOUS at 05:33

## 2022-01-04 RX ADMIN — ASPIRIN 81 MG: 81 TABLET, CHEWABLE ORAL at 09:09

## 2022-01-04 RX ADMIN — PERFLUTREN 2 ML: 6.52 INJECTION, SUSPENSION INTRAVENOUS at 08:25

## 2022-01-04 RX ADMIN — NITROGLYCERIN 1 INCH: 20 OINTMENT TOPICAL at 00:38

## 2022-01-04 RX ADMIN — NITROGLYCERIN 1 INCH: 20 OINTMENT TOPICAL at 06:28

## 2022-01-04 NOTE — DISCHARGE SUMMARY
[facility] Cardiology Discharge Summary     Patient ID:  Claudell Sewer  [id number]  49 y.o.  1970    Admit date: 1/3/2022    Discharge date:  1/4/2022    Admitting Physician: Samson Laird MD     Discharge Physician: Dr. Jewel Thao    Admission Diagnoses: Chest pain [R07.9]    Discharge Diagnoses:    Diagnosis    Unstable angina     Nonobstructive CAD    Hypokalemia    Essential hypertension     Family history of premature CAD    Dyslipidemia        Cardiology Procedures this admission:  Diagnostic left heart catheterization  EchoCardiogram  Consults: None    Hospital Course: Patient is a 49 y.o. WM with h/o HTN and dyslipidemia who presented to ED with c/o acute onset of left-sided chest pain. He reports CP w/o radiation with associated nausea, heart racing, felt anxious and diaphoretic. Denies SOB, vomiting, LE swelling or syncope. Pt states he had a CT Ca score which was elevated and records show it was 379. Pt's father had CABG in early 63's and DF had MI at 51 yo. In the ED, BP elevated initially at 171/112, then repeat 150/99. HS troponin is negative at 5.5, CXR unremarkable and ECG showed ST with IC RBBB and no acute ST-T wave changes. Cardiology asked to evaluate for admission. Pt had NTG paste placed in ED and is feeling better w/o current CP. Patient was admitted with unstable angina. Patient was started on BB. ECHO was performed and showed:   Left Ventricle Left ventricle size is normal. Normal wall thickness. Normal wall motion. Normal left ventricular systolic function with a visually estimated EF of 60 - 65%. Normal diastolic function. Left Atrium Left atrium size is normal.   Right Ventricle Right ventricle size is normal. Normal wall thickness. Normal systolic function. Right Atrium Right atrium size is normal.   Aortic Valve Valve structure is normal. No transvalvular regurgitation. No stenosis.    Mitral Valve Valve structure is normal. No transvalvular regurgitation. No stenosis. Tricuspid Valve Valve structure is normal. No transvalvular regurgitation. No stenosis. Unable to assess RVSP due to inadequate or insignificant tricuspid regurgitation. Pulmonic Valve Valve structure is normal. No transvalvular regurgitation. No stenosis. Aorta Normal sized annulus, sinus of Valsalva, ascending aorta, aortic arch, descending aorta and abdominal aorta. IVC/Hepatic Veins IVC diameter is less than or equal to 21 mm and decreases greater than 50% during inspiration; therefore the estimated right atrial pressure is normal (~3 mmHg). Pericardium No pericardial effusion. Patient underwent cardiac catheterization by Dr. Amarilys Borges. Patient was found to have mild to moderate nonobstructive proximal LAD stenosis with DFR 0.95-0.97, minimal coronary disease involving the left circumflex and right coronary arteries, and normal LV systolic function. Patient tolerated the procedure well and was taken to recovery. Later that afternoon, patient was up feeling well without any complaints of chest pain or shortness of breath. Patient's right radial cath site was clean, dry and intact without hematoma or bruit. Patient's labs were stable. Patient was seen and examined by Dr. Amarilys Borges and determined stable and ready for discharge. Patient was instructed on the importance of medication compliance and office follow up. For maximized medical therapy for CAD, patient will continue ACE-I, and high intensity statin as well. The patient will follow up with [facility] -- Dr. Amarilys Borges on Jan 26th at 10:30 am in [facility]. DISPOSITION: The patient is being discharged home in stable condition on a low saturated fat, low cholesterol and low salt diet. The patient is instructed to advance activities as tolerated to the limit of fatigue or shortness of breath. The patient is instructed to avoid all heavy lifting, straining, stooping or squatting for 3-5 days. The patient is instructed to watch the cath site for bleeding/oozing; if seen, the patient is instructed to apply firm pressure with a clean cloth and call [facility] at [phone]. The patient is instructed to watch for signs of infection which include: increasing area of redness, fever/hot to touch or purulent drainage at the catheterization site. The patient is instructed not to soak in a bathtub for 7-10 days, but is cleared to shower. The patient is instructed to call the office or return to the ER for immediate evaluation for any shortness of breath or chest pain not relieved by NTG. Discharge Exam: Patient has been seen by Dr. Donte Whiting: see his progress note for exam details.     Visit Vitals  /71   Pulse 83   Temp 98.1 °F (36.7 °C)   Resp 15   Ht 5' 10\" (1.778 m)   Wt 83.9 kg (185 lb)   SpO2 97%   BMI 26.54 kg/m²       Recent Results (from the past 24 hour(s))   EKG, 12 LEAD, INITIAL    Collection Time: 01/03/22  2:15 PM   Result Value Ref Range    Ventricular Rate 128 BPM    Atrial Rate 128 BPM    P-R Interval 164 ms    QRS Duration 98 ms    Q-T Interval 298 ms    QTC Calculation (Bezet) 435 ms    Calculated P Axis 71 degrees    Calculated R Axis 167 degrees    Calculated T Axis 66 degrees    Diagnosis       Sinus tachycardia with Premature supraventricular complexes  Possible Left atrial enlargement  Right axis deviation  Incomplete right bundle branch block  Poor R wave progression  Abnormal ECG  No previous ECGs available  Confirmed by ST LUIS NOGUEIRA MD (), IVANA TANG ([id number]) on 1/3/2022 5:08:15 PM     TROPONIN-HIGH SENSITIVITY    Collection Time: 01/03/22  2:26 PM   Result Value Ref Range    Troponin-High Sensitivity 5.5 0 - 14 pg/mL   CBC WITH AUTOMATED DIFF    Collection Time: 01/03/22  2:26 PM   Result Value Ref Range    WBC 6.3 4.3 - 11.1 K/uL    RBC 4.95 4.23 - 5.6 M/uL    HGB 15.9 13.6 - 17.2 g/dL    HCT 44.8 41.1 - 50.3 %    MCV 90.5 79.6 - 97.8 FL    MCH 32.1 26.1 - 32.9 PG    MCHC 35.5 (H) 31.4 - 35.0 g/dL    RDW 12.2 11.9 - 14.6 %    PLATELET 687 187 - 828 K/uL    MPV 10.8 9.4 - 12.3 FL    ABSOLUTE NRBC 0.00 0.0 - 0.2 K/uL    DF AUTOMATED      NEUTROPHILS 61 43 - 78 %    LYMPHOCYTES 27 13 - 44 %    MONOCYTES 10 4.0 - 12.0 %    EOSINOPHILS 1 0.5 - 7.8 %    BASOPHILS 1 0.0 - 2.0 %    IMMATURE GRANULOCYTES 1 0.0 - 5.0 %    ABS. NEUTROPHILS 3.8 1.7 - 8.2 K/UL    ABS. LYMPHOCYTES 1.7 0.5 - 4.6 K/UL    ABS. MONOCYTES 0.6 0.1 - 1.3 K/UL    ABS. EOSINOPHILS 0.1 0.0 - 0.8 K/UL    ABS. BASOPHILS 0.1 0.0 - 0.2 K/UL    ABS. IMM. GRANS. 0.0 0.0 - 0.5 K/UL   METABOLIC PANEL, COMPREHENSIVE    Collection Time: 01/03/22  2:26 PM   Result Value Ref Range    Sodium 140 136 - 145 mmol/L    Potassium 3.2 (L) 3.5 - 5.1 mmol/L    Chloride 105 98 - 107 mmol/L    CO2 26 21 - 32 mmol/L    Anion gap 9 7 - 16 mmol/L    Glucose 181 (H) 65 - 100 mg/dL    BUN 13 6 - 23 MG/DL    Creatinine 1.13 0.8 - 1.5 MG/DL    GFR est AA >60 >60 ml/min/1.73m2    GFR est non-AA >60 >60 ml/min/1.73m2    Calcium 9.6 8.3 - 10.4 MG/DL    Bilirubin, total 0.7 0.2 - 1.1 MG/DL    ALT (SGPT) 54 12 - 65 U/L    AST (SGOT) 29 15 - 37 U/L    Alk.  phosphatase 66 50 - 136 U/L    Protein, total 7.7 6.3 - 8.2 g/dL    Albumin 4.2 3.5 - 5.0 g/dL    Globulin 3.5 2.3 - 3.5 g/dL    A-G Ratio 1.2 1.2 - 3.5     LIPASE    Collection Time: 01/03/22  2:26 PM   Result Value Ref Range    Lipase 125 73 - 393 U/L   MAGNESIUM    Collection Time: 01/03/22  2:26 PM   Result Value Ref Range    Magnesium 3.1 (H) 1.8 - 2.4 mg/dL   TROPONIN-HIGH SENSITIVITY    Collection Time: 01/03/22  4:18 PM   Result Value Ref Range    Troponin-High Sensitivity 6.4 0 - 14 pg/mL   METABOLIC PANEL, BASIC    Collection Time: 01/04/22  5:11 AM   Result Value Ref Range    Sodium 138 138 - 145 mmol/L    Potassium 4.0 3.5 - 5.1 mmol/L    Chloride 106 98 - 107 mmol/L    CO2 27 21 - 32 mmol/L    Anion gap 5 (L) 7 - 16 mmol/L    Glucose 117 (H) 65 - 100 mg/dL    BUN 12 6 - 23 MG/DL    Creatinine 0. 96 0.8 - 1.5 MG/DL    GFR est AA >60 >60 ml/min/1.73m2    GFR est non-AA >60 >60 ml/min/1.73m2    Calcium 9.0 8.3 - 10.4 MG/DL   CBC W/O DIFF    Collection Time: 01/04/22  5:11 AM   Result Value Ref Range    WBC 4.7 4.3 - 11.1 K/uL    RBC 4.31 4.23 - 5.6 M/uL    HGB 14.0 13.6 - 17.2 g/dL    HCT 40.0 (L) 41.1 - 50.3 %    MCV 92.8 79.6 - 97.8 FL    MCH 32.5 26.1 - 32.9 PG    MCHC 35.0 31.4 - 35.0 g/dL    RDW 12.4 11.9 - 14.6 %    PLATELET 031 (L) 863 - 450 K/uL    MPV 10.9 9.4 - 12.3 FL    ABSOLUTE NRBC 0.00 0.0 - 0.2 K/uL   TROPONIN-HIGH SENSITIVITY    Collection Time: 01/04/22  5:11 AM   Result Value Ref Range    Troponin-High Sensitivity 8.2 0 - 14 pg/mL   LIPID PANEL    Collection Time: 01/04/22  5:11 AM   Result Value Ref Range    Cholesterol, total 146 <200 MG/DL    Triglyceride 132 35 - 150 MG/DL    HDL Cholesterol 53 40 - 60 MG/DL    LDL, calculated 66.6 <100 MG/DL    VLDL, calculated 26.4 (H) 6.0 - 23.0 MG/DL    CHOL/HDL Ratio 2.8     ECHO ADULT COMPLETE    Collection Time: 01/04/22  8:30 AM   Result Value Ref Range    LA Minor Axis 4.8 cm    LA Major Axis 5.4 cm    LA Area 2C 15.7 cm2    LA Area 4C 18.6 cm2    LA Volume BP 46 18 - 58 mL    LA Diameter 3.0 cm    LV EDV A4C 97 mL    LV ESV A4C 37 mL    IVSd 1.0 0.6 - 1.0 cm    LVIDd 4.5 4.2 - 5.9 cm    LVIDs 3.1 cm    LVOT Diameter 2.4 cm    LVOT Mean Gradient 2 mmHg    LVOT VTI 21.2 cm    LVOT Peak Velocity 1.1 m/s    LVOT Peak Gradient 5 mmHg    LVPWd 1.0 0.6 - 1.0 cm    LV E' Lateral Velocity 13 cm/s    LV E' Septal Velocity 9 cm/s    LV Ejection Fraction A4C 61 %    LVOT Area 4.5 cm2    LVOT SV 95.9 ml    AV Cusp Mmode 2.2 cm    AV Mean Velocity 0.9 m/s    AV Mean Gradient 4 mmHg    AV VTI 27.4 cm    AV Peak Velocity 1.4 m/s    AV Peak Gradient 8 mmHg    AV Area by VTI 3.4 cm2    AV Area by Peak Velocity 3.3 cm2    Ascending Aorta 3.0 cm    MV E Wave Deceleration Time 190.0 ms    MV A Velocity 0.66 m/s    MV E Velocity 0.88 m/s    MV Mean Velocity 0.6 m/s    MV Mean Gradient 2 mmHg    MV VTI 23.6 cm    MV Max Velocity 1.0 m/s    MV Peak Gradient 4 mmHg    MV Area by VTI 4.1 cm2    RVIDd 2.6 cm    TAPSE 2.5 (A) 1.5 - 2.0 cm    Fractional Shortening 2D 31 28 - 44 %    LV ESV Index A4C 18 mL/m2    LV EDV Index A4C 48 mL/m2    LVIDd Index 2.23 cm/m2    LVIDs Index 1.53 cm/m2    LV RWT Ratio 0.44     LV Mass 2D 153.3 88 - 224 g    LV Mass 2D Index 75.9 49 - 115 g/m2    MV E/A 1.33     E/E' Ratio (Averaged) 8.27     E/E' Lateral 6.77     E/E' Septal 9.78     LA Volume Index BP 23 16 - 34 ml/m2    LVOT Stroke Volume Index 47.5 mL/m2    LA Size Index 1.49 cm/m2    Ascending Aorta Index 1.49 cm/m2    AV Velocity Ratio 0.79     LVOT:AV VTI Index 0.77     MEHRDAD/BSA VTI 1.7 cm2/m2    MEHRDAD/BSA Peak Velocity 1.6 cm2/m2    MV:LVOT VTI Index 1.11     Est. RA Pressure 3 mmHg   POC ACTIVATED CLOTTING TIME    Collection Time: 01/04/22 10:06 AM   Result Value Ref Range    Activated Clotting Time (POC) 273 (H) 70 - 128 SECS       Patient Instructions:   Current Discharge Medication List      START taking these medications    Details   pantoprazole (Protonix) 40 mg tablet Take 1 Tablet by mouth daily. Qty: 90 Tablet, Refills: 3  Start date: 1/4/2022      aspirin 81 mg chewable tablet Take 1 Tablet by mouth daily. Qty: 30 Tablet, Refills: 0  Start date: 1/5/2022         CONTINUE these medications which have NOT CHANGED    Details   rosuvastatin (CRESTOR) 40 mg tablet Take 1 Tablet by mouth nightly.   Qty: 90 Tablet, Refills: 3      lisinopril (PRINIVIL, ZESTRIL) 10 mg tablet TAKE 1 TABLET BY MOUTH DAILY  Qty: 90 Tab, Refills: 3    Associated Diagnoses: Essential hypertension             Signed:  TELLO Bourne  1/4/2022  10:47 AM

## 2022-01-04 NOTE — PROGRESS NOTES
R band deflation completed. Riight radial dressed with 4x4 & tegaderm using sterile technique. No bleeding or hematoma.  Tolerated without difficulty

## 2022-01-04 NOTE — PROGRESS NOTES
Discharge instructions given per orders, voiced good understanding of Right radial care, medications & follow up care.  Denies any questions

## 2022-01-04 NOTE — PROGRESS NOTES
TRANSFER - OUT REPORT:    Verbal report given to Rn(name) on Juan José Braun.  being transferred to (unit) for routine progression of care       Report consisted of patients Situation, Background, Assessment and   Recommendations(SBAR). Information from the following report(s) SBAR was reviewed with the receiving nurse.     [facility] dre Zheng  RRA - TR band 12 mls  Diagnostic, medical management    6 mg Versed  100 mcg Fentanyl  10,000 units Heparin     @ [id number]

## 2022-01-04 NOTE — PROGRESS NOTES
Report received from [facility] [place] RN. Procedural finding communicated. Intra procedural medication administration reviewed. Progression of care discussed. Patient received into CPRU room 1, Post [facility] - Dr Nichole Moses    Access site without bleeding or swelling. Right Radial TR band in place (12 ml air)    Patient instructed to limit movement of right upper extremity. Routine post procedural vital signs & site assessment initiated.

## 2022-01-04 NOTE — DISCHARGE INSTRUCTIONS
HEART CATHETERIZATION/ANGIOGRAPHY DISCHARGE INSTRUCTIONS    1. Check puncture site frequently for swelling or bleeding. If there is any bleeding, apply pressure over the area with a clean towel or washcloth and call 911. Notify your doctor for any redness, swelling, drainage, or oozing from the puncture site. Notify your doctor for any fever or chills. 2. If the extremity becomes cold, numb, or painful call [facility] at [phone].  3. Activity should be limited for the next 48 hours. No heavy lifting, pushing, pulling  or strenuous activity for 48 hours. No heavy lifting (anything over 10 pounds) for 3 days. 4. You may resume your usual diet. Drink more fluids than usual.  5. Have a responsible person drive you home and stay with you for at least 24 hours after your heart catheterization/angiography. 6. You may remove bandage from your Right Arm in 24 hours. You may shower in 24 hours. No tub baths, hot tubs, or swimming for 1 week. Do not place any lotions, creams, powders, or ointments over puncture site for 1 week. You may place a clean band-aid over the puncture site each day for 5 days. Change daily. Sedation for a Medical Procedure: Care Instructions     You were given a sedative medication during your visit. While many of the effects will have worn   off before you leave; you may continue to feel some effects for several hours. Common side effects from sedation include:  · Feeling sleepy. (Your doctors and nurses will make sure you are not too sleepy to go home.)  · Nausea and vomiting. This usually does not last long. · Feeling tired. How can you care for yourself at home? Activity    · Don't do anything for 24 hours that requires attention to detail. It takes time for the medicine effects to completely wear off. · Do not make important legal decisions for 24 hours. · Do not sign any legal documents for 24 hours.      · Do not drink alcohol today     · For your safety, you should not drive or operate heavy machinery for the remainder of the day     · Rest when you feel tired. Getting enough sleep will help you recover. Diet    · You can eat your normal diet, unless your doctor gives you other instructions. If your stomach is upset, try clear liquids and bland, low-fat foods like plain toast or rice. · Drink plenty of fluids (unless your doctor tells you not to). · Don't drink alcohol for 24 hours. Medicines    · Be safe with medicines. Read and follow all instructions on the label. · If the doctor gave you a prescription medicine for pain, take it as prescribed. · If you are not taking a prescription pain medicine, ask your doctor if you can take an over-the-counter medicine. · If you think your pain medicine is making you sick to your stomach:  · Take your medicine after meals (unless your doctor has told you not to). · Ask your doctor for a different pain medicine. I have read the above instructions and have had the opportunity to ask questions. Patient: ________________________   Date: _____________    Witness: _______________________   Date: _____________    Coronary Artery Disease: Care Instructions  Your Care Instructions     The heart is a muscle, and like any muscle, it needs blood to work well. Coronary artery disease occurs when the arteries that bring oxygen-rich blood to your heart have a buildup of plaque--deposits of fats and other substances. Plaque can reduce blood flow to the heart muscle. This can cause angina symptoms such as chest pain or pressure. A heart attack can happen if blood flow is completely blocked. You can do a lot to improve your health and prevent a heart attack. Eating healthy food, not smoking, getting regular exercise, and taking your medicine are the main things you can do every day to stay healthy. Follow-up care is a key part of your treatment and safety.  Be sure to make and go to all appointments, and call your doctor if you are having problems. It's also a good idea to know your test results and keep a list of the medicines you take. How can you care for yourself at home? Medicines    · Be safe with medicines. Take your medicines exactly as prescribed. Call your doctor if you think you are having a problem with your medicine. You will get more details on the specific medicines your doctor prescribes.     · You will take medicines that lower your risk of a heart attack and lower your risk of dying early from heart disease. These medicines include:  ? Angiotensin-converting enzyme (ACE) inhibitors or angiotensin II receptor blockers (ARBs). They lower blood pressure. ? Aspirin and other blood thinners. They prevent blood clots that could cause a heart attack. ? Beta-blockers. They lower the heart's workload. ? Statins and other cholesterol medicines. They lower cholesterol.     · If your doctor has given you nitroglycerin for angina symptoms (such as chest pain or pressure) keep it with you at all times. If you have symptoms, sit down and rest, and take the first dose of nitroglycerin as directed. If your symptoms get worse or are not getting better within 5 minutes, call 911 right away. Stay on the phone. The emergency  will give you further instructions.     · Do not take any over-the-counter medicines, vitamins, or herbal products without talking to your doctor first.   Lifestyle  Ask your doctor if a cardiac rehab program is right for you. Cardiac rehab can help you make lifestyle changes. In cardiac rehab, a team of health professionals provides education and support to help you make new, healthy habits.    · Do not smoke. Avoid secondhand smoke too. Smoking can increase your risk of a heart attack or stroke. If you need help quitting, talk to your doctor about stop-smoking programs and medicines. These can increase your chances of quitting for good.     · Eat heart-healthy foods.  These include vegetables, fruits, nuts, beans, lean meat, fish, and whole grains. Limit saturated fat, sodium, and alcohol. Limit drinks and foods with added sugar.     · If your doctor recommends it, get more exercise. Ask your doctor what level of exercise is safe for you. Walking is a good choice. Bit by bit, increase the amount you walk every day. Try for at least 30 minutes on most days of the week. You also may want to swim, bike, or do other activities.     · Stay at a healthy weight. Lose weight if you need to.     · Manage other health problems. These include diabetes, high blood pressure, and high cholesterol. If you think you may have a problem with alcohol or drug use, talk to your doctor.     · If you have angina symptoms, pay attention to your symptoms. This can help you see what causes them and what is typical for you.     · Avoid colds and flu. Get a pneumococcal vaccine shot. If you have had one before, ask your doctor whether you need another dose. Get a flu vaccine every year. If you must be around people with colds or flu, wash your hands often.     · If you think you have symptoms of depression, talk to your doctor. Symptoms include feeling sad or hopeless most of the time, or losing interest in activities that used to make you happy. When should you call for help? Call 911 anytime you think you may need emergency care. For example, call if:    · You have symptoms of a heart attack. These may include:  ? Chest pain or pressure, or a strange feeling in the chest.  ? Sweating. ? Shortness of breath. ? Nausea or vomiting. ? Pain, pressure, or a strange feeling in the back, neck, jaw, or upper belly or in one or both shoulders or arms. ? Lightheadedness or sudden weakness. ? A fast or irregular heartbeat. After you call 911, the  may tell you to chew 1 adult-strength or 2 to 4 low-dose aspirin. Wait for an ambulance.  Do not try to drive yourself.     · You have angina symptoms (such as chest pain or pressure) that do not go away with rest or are not getting better within 5 minutes after you take a dose of nitroglycerin.     · You passed out (lost consciousness). Call your doctor now or seek immediate medical care if:    · You are having angina symptoms, such as chest pain or pressure, more often than usual, or they are different or worse than usual.     · You have new or increased shortness of breath.     · You are dizzy or lightheaded, or you feel like you may faint. Watch closely for changes in your health, and be sure to contact your doctor if you have any problems. Where can you learn more? Go to http://www.gray.com/  Enter A110 in the search box to learn more about \"Coronary Artery Disease: Care Instructions. \"  Current as of: April 29, 2021               Content Version: 13.0  © 1766-3508 [employer]. Care instructions adapted under license by [employer] (which disclaims liability or warranty for this information). If you have questions about a medical condition or this instruction, always ask your healthcare professional. [address] any warranty or liability for your use of this information.

## 2022-02-04 PROBLEM — E78.5 DYSLIPIDEMIA: Chronic | Status: ACTIVE | Noted: 2022-02-04

## 2022-02-04 PROBLEM — I25.10 CORONARY ARTERY DISEASE INVOLVING NATIVE CORONARY ARTERY OF NATIVE HEART WITHOUT ANGINA PECTORIS: Chronic | Status: ACTIVE | Noted: 2022-02-04

## 2022-03-18 PROBLEM — E78.5 DYSLIPIDEMIA: Status: ACTIVE | Noted: 2022-02-04

## 2022-03-18 PROBLEM — R07.2 PRECORDIAL PAIN: Status: ACTIVE | Noted: 2021-05-05

## 2022-03-18 PROBLEM — R07.9 CHEST PAIN: Status: ACTIVE | Noted: 2022-01-03

## 2022-03-19 PROBLEM — I10 ESSENTIAL HYPERTENSION: Status: ACTIVE | Noted: 2021-05-05

## 2022-03-19 PROBLEM — Z82.49 FAMILY HISTORY OF PREMATURE CAD: Status: ACTIVE | Noted: 2021-05-05

## 2022-03-19 PROBLEM — I25.10 CORONARY ARTERY DISEASE INVOLVING NATIVE CORONARY ARTERY OF NATIVE HEART WITHOUT ANGINA PECTORIS: Status: ACTIVE | Noted: 2022-02-04

## 2022-03-20 PROBLEM — E87.6 HYPOKALEMIA: Status: ACTIVE | Noted: 2022-01-03

## 2022-03-20 PROBLEM — Z72.0 SMOKELESS TOBACCO USE: Status: ACTIVE | Noted: 2022-01-03

## 2022-08-04 ENCOUNTER — PATIENT MESSAGE (OUTPATIENT)
Dept: CARDIOLOGY CLINIC | Age: 52
End: 2022-08-04

## 2022-09-07 ENCOUNTER — OFFICE VISIT (OUTPATIENT)
Dept: CARDIOLOGY CLINIC | Age: 52
End: 2022-09-07
Payer: COMMERCIAL

## 2022-09-07 VITALS
HEART RATE: 80 BPM | BODY MASS INDEX: 27.43 KG/M2 | HEIGHT: 70 IN | WEIGHT: 191.6 LBS | SYSTOLIC BLOOD PRESSURE: 118 MMHG | DIASTOLIC BLOOD PRESSURE: 70 MMHG

## 2022-09-07 DIAGNOSIS — I25.10 CORONARY ARTERY DISEASE INVOLVING NATIVE CORONARY ARTERY OF NATIVE HEART WITHOUT ANGINA PECTORIS: Primary | ICD-10-CM

## 2022-09-07 DIAGNOSIS — E78.5 DYSLIPIDEMIA: ICD-10-CM

## 2022-09-07 DIAGNOSIS — I10 ESSENTIAL HYPERTENSION: ICD-10-CM

## 2022-09-07 DIAGNOSIS — N52.2 DRUG-INDUCED ERECTILE DYSFUNCTION: Chronic | ICD-10-CM

## 2022-09-07 PROCEDURE — 99214 OFFICE O/P EST MOD 30 MIN: CPT | Performed by: INTERNAL MEDICINE

## 2022-09-07 ASSESSMENT — ENCOUNTER SYMPTOMS
COUGH: 0
SHORTNESS OF BREATH: 0
BACK PAIN: 0
ABDOMINAL PAIN: 0

## 2022-09-07 NOTE — PROGRESS NOTES
[facility] CARDIOLOGY  [address] [facility], [address]      22      NAME:  Alex Robles. : 1970  MRN: [id number]      SUBJECTIVE:   Alex Robles. is a 49 y.o. male seen for a follow up visit regarding the following:     Chief Complaint   Patient presents with    Coronary Artery Disease    Hypertension       HPI:   male with history of hypertension and strong premature family history presents with intermittent chest discomfort pain  associated with exertion also occurs at rest.  Baseline EKG with sinus rhythm incomplete right bundle branch block no ST-T wave changes. Now with a history of worsening hypertension moderately elevated lipids. Now was in the ER with CP and cardiac catheterization  with moderate non-obstructive CAD. Past Medical History, Past Surgical History, Family history, Social History, and Medications were all reviewed with the patient today and updated as necessary. Current Outpatient Medications   Medication Sig Dispense Refill    aspirin 81 MG chewable tablet Take 81 mg by mouth daily      lisinopril (PRINIVIL;ZESTRIL) 10 MG tablet TAKE 1 TABLET BY MOUTH DAILY      pantoprazole (PROTONIX) 40 MG tablet Take 40 mg by mouth daily      rosuvastatin (CRESTOR) 40 MG tablet Take 40 mg by mouth      sildenafil (VIAGRA) 100 MG tablet Take 100 mg by mouth daily as needed       No current facility-administered medications for this visit.      Patient Active Problem List    Diagnosis Date Noted    Drug-induced erectile dysfunction 2022     Priority: Medium    Dyslipidemia 2022    Coronary artery disease involving native coronary artery of native heart without angina pectoris 2022:  Mild/moderate CAD DFR mLAD 0.96      Chest pain 2022    Hypokalemia 2022    Smokeless tobacco use 2022    Precordial pain 2021    Family history of premature CAD 2021    Essential hypertension 2021 Family History   Problem Relation Age of Onset    Heart Disease Father     Diabetes Father     No Known Problems Mother      Social History     Tobacco Use    Smoking status: Never    Smokeless tobacco: Current   Substance Use Topics    Alcohol use: Yes     Alcohol/week: 5.0 standard drinks       Review Of Symptoms    Review of Systems   Constitutional: Negative for fever and malaise/fatigue. HENT:  Negative for nosebleeds. Cardiovascular:  Negative for chest pain, dyspnea on exertion and palpitations. Respiratory:  Negative for cough and shortness of breath. Musculoskeletal:  Negative for back pain, muscle cramps, muscle weakness and myalgias. Gastrointestinal:  Negative for abdominal pain. Neurological:  Negative for dizziness. Physical Exam  Blood pressure 118/70, pulse 80, height 5' 10\" (1.778 m), weight 191 lb 9.6 oz (86.9 kg). Physical Exam  HENT:      Head: Normocephalic and atraumatic. Eyes:      Extraocular Movements: Extraocular movements intact. Cardiovascular:      Rate and Rhythm: Normal rate and regular rhythm. Heart sounds: No murmur heard. Pulmonary:      Effort: Pulmonary effort is normal.      Breath sounds: Normal breath sounds. Abdominal:      Palpations: Abdomen is soft. Musculoskeletal:         General: Normal range of motion. Skin:     General: Skin is warm and dry. Neurological:      General: No focal deficit present. Mental Status: He is oriented to person, place, and time. Medical problems, medical history and test results were reviewed with the patient today.       Lab Results   Component Value Date    CHOL 146 01/04/2022    CHOL 150 09/01/2021     Lab Results   Component Value Date    TRIG 132 01/04/2022    TRIG 123 09/01/2021     Lab Results   Component Value Date    HDL 53 01/04/2022    HDL 58 09/01/2021     Lab Results   Component Value Date    LDLCALC 66.6 01/04/2022    LDLCALC 67.4 09/01/2021     Lab Results   Component Value Date LABVLDL 26.4 (H) 01/04/2022    LABVLDL 24.6 (H) 09/01/2021     Lab Results   Component Value Date    CHOLHDLRATIO 2.8 01/04/2022    CHOLHDLRATIO 2.6 09/01/2021        No results found for: LABA1C  No results found for: EAG     Lab Results   Component Value Date     01/04/2022    K 4.0 01/04/2022     01/04/2022    CO2 27 01/04/2022    BUN 12 01/04/2022    CREATININE 0.96 01/04/2022    GLUCOSE 117 (H) 01/04/2022    CALCIUM 9.0 01/04/2022    PROT 7.7 01/03/2022    LABALBU 4.2 01/03/2022    BILITOT 0.7 01/03/2022    ALKPHOS 66 01/03/2022    AST 29 01/03/2022    ALT 54 01/03/2022    LABGLOM >60 05/17/2021    GFRAA >60 01/04/2022    AGRATIO 1.2 01/03/2022    GLOB 3.5 01/03/2022       Lab Results   Component Value Date/Time     01/04/2022 05:11 AM    K 4.0 01/04/2022 05:11 AM     01/04/2022 05:11 AM    CO2 27 01/04/2022 05:11 AM    BUN 12 01/04/2022 05:11 AM    CREATININE 0.96 01/04/2022 05:11 AM    GLUCOSE 117 01/04/2022 05:11 AM    CALCIUM 9.0 01/04/2022 05:11 AM        Lab Results   Component Value Date    WBC 4.7 01/04/2022    HGB 14.0 01/04/2022    HCT 40.0 (L) 01/04/2022    MCV 92.8 01/04/2022     (L) 01/04/2022             ASSESSMENT:     Diagnoses and all orders for this visit:      Coronary artery disease - Moderate mLAD with DFR 0.96 and minimal CAD otherwise. Dyslipidemia - stable on rosuvastatin      Essential hypertension -tolerated hypertension. Patient will monitor blood pressure at home.         Erectile dysfunction - On Viagra       Problem List Items Addressed This Visit          Circulatory    Coronary artery disease involving native coronary artery of native heart without angina pectoris - Primary    Relevant Orders    Lipid Panel    Comprehensive Metabolic Panel    CBC    Essential hypertension       Other    Drug-induced erectile dysfunction (Chronic)    Relevant Orders    Sara Mendez MD, Urology, [place]    Dyslipidemia    Relevant Orders Lipid Panel    Comprehensive Metabolic Panel    CBC       There are no discontinued medications. Patient has been instructed and agrees to call our office with any issues or other concerns related to their cardiac condition(s) and/or complaint(s). Return in about 6 months (around 3/7/2023).        Loulou Chin MD  9/7/2022

## 2022-11-17 ENCOUNTER — OFFICE VISIT (OUTPATIENT)
Dept: UROLOGY | Age: 52
End: 2022-11-17
Payer: COMMERCIAL

## 2022-11-17 DIAGNOSIS — N48.6 PEYRONIE'S DISEASE: Primary | ICD-10-CM

## 2022-11-17 LAB
BILIRUBIN, URINE, POC: NEGATIVE
BLOOD URINE, POC: NEGATIVE
GLUCOSE URINE, POC: NEGATIVE
KETONES, URINE, POC: NEGATIVE
LEUKOCYTE ESTERASE, URINE, POC: NEGATIVE
NITRITE, URINE, POC: NEGATIVE
PH, URINE, POC: 6 (ref 4.6–8)
PROTEIN,URINE, POC: NEGATIVE
SPECIFIC GRAVITY, URINE, POC: 1.02 (ref 1–1.03)
URINALYSIS CLARITY, POC: NORMAL
URINALYSIS COLOR, POC: NORMAL
UROBILINOGEN, POC: NORMAL

## 2022-11-17 PROCEDURE — 81003 URINALYSIS AUTO W/O SCOPE: CPT | Performed by: UROLOGY

## 2022-11-17 PROCEDURE — 99203 OFFICE O/P NEW LOW 30 MIN: CPT | Performed by: UROLOGY

## 2022-11-17 ASSESSMENT — ENCOUNTER SYMPTOMS
EYE PAIN: 0
INDIGESTION: 0
BACK PAIN: 0
SKIN LESIONS: 0
ABDOMINAL PAIN: 0
COUGH: 0
SHORTNESS OF BREATH: 0
NAUSEA: 0
DIARRHEA: 0
HEARTBURN: 0
CONSTIPATION: 0
VOMITING: 0
WHEEZING: 0
EYE DISCHARGE: 0
BLOOD IN STOOL: 0

## 2022-11-17 NOTE — PROGRESS NOTES
[facility] Urology  [address] [facility]    [address]  [place], [address] George [place]. : 1970    Chief Complaint   Patient presents with    Follow-up          HPI     Sanjay Campos. is a 47 y.o. male (his wife knows Marco A Hinojosa) referred in  by Dr. Fernie Vogel in regards to a curvature to the LEFT of the penis with erection. He has noticed this over the last year. Erection is straight, but whole erect penis is pointed slightly leftward as compared to straight ahead. He has minimal to no ED. Patient can think of no other instigating or exacerbating events. History reviewed. No pertinent past medical history. Past Surgical History:   Procedure Laterality Date    OTHER SURGICAL HISTORY      BIRTH YAMILEX REMOVED ON NECK    WISDOM TOOTH EXTRACTION       Current Outpatient Medications   Medication Sig Dispense Refill    aspirin 81 MG chewable tablet Take 81 mg by mouth daily      lisinopril (PRINIVIL;ZESTRIL) 10 MG tablet TAKE 1 TABLET BY MOUTH DAILY      pantoprazole (PROTONIX) 40 MG tablet Take 40 mg by mouth daily      rosuvastatin (CRESTOR) 40 MG tablet Take 40 mg by mouth      sildenafil (VIAGRA) 100 MG tablet Take 100 mg by mouth daily as needed       No current facility-administered medications for this visit. No Known Allergies  Social History     Socioeconomic History    Marital status:      Spouse name: Not on file    Number of children: Not on file    Years of education: Not on file    Highest education level: Not on file   Occupational History    Not on file   Tobacco Use    Smoking status: Never    Smokeless tobacco: Current   Substance and Sexual Activity    Alcohol use:  Yes     Alcohol/week: 5.0 standard drinks    Drug use: Not on file    Sexual activity: Not on file   Other Topics Concern    Not on file   Social History Narrative    Not on file     Social Determinants of Health     Financial Resource Strain: Not on file   Food Insecurity: Not on file   Transportation Needs: Not on file   Physical Activity: Not on file   Stress: Not on file   Social Connections: Not on file   Intimate Partner Violence: Not on file   Housing Stability: Not on file     Family History   Problem Relation Age of Onset    Heart Disease Father     Diabetes Father     No Known Problems Mother        Review of Systems  Constitutional:   Negative for fever, chills, appetite change, malaise/fatigue, headaches and weight loss. Skin:  Negative for skin lesions, rash and itching. Eyes:  Negative for visual disturbance, eye pain and eye discharge. ENT:  Negative for difficulty articulating words, pain swallowing, high frequency hearing loss and dry mouth. Respiratory:  Negative for cough, blood in sputum, shortness of breath and wheezing. Cardiovascular: Positive for hypertension. Negative for chest pain, irregular heartbeat, leg pain, leg swelling, regular rate and rhythm and varicose veins. GI:  Negative for nausea, vomiting, abdominal pain, blood in stool, constipation, diarrhea, indigestion and heartburn. Genitourinary:  Negative for urinary burning, hematuria, flank pain, recurrent UTIs, history of urolithiasis, nocturia, slower stream, straining, urgency, leakage w/ urge, frequent urination, incomplete emptying, erectile dysfunction, testicular pain, sexually transmitted disease, discharge and urethral stricture. Musculoskeletal:  Negative for back pain, bone pain, arthralgias, tenderness, muscle weakness and neck pain. Neurological:  Negative for dizziness, focal weakness, numbness, seizures and tremors. Psychological:  Negative for depression and psychiatric problem. Endocrine:  Negative for cold intolerance, thirst, excessive urination, fatigue and heat intolerance. Hem/Lymphatic:  Negative for easy bleeding, easy bruising and frequent infections.     Urinalysis  UA - Dipstick  Results for orders placed or performed in visit on 11/17/22   [facility] URINALYSIS DIP STICK AUTO W/O MICRO   Result Value Ref Range    Color (UA POC)      Clarity (UA POC)      Glucose, Urine, POC Negative Negative    Bilirubin, Urine, POC Negative Negative    KETONES, Urine, POC Negative Negative    Specific Gravity, Urine, POC 1.020 1.001 - 1.035    Blood (UA POC) Negative Negative    pH, Urine, POC 6.0 4.6 - 8.0    Protein, Urine, POC Negative Negative    Urobilinogen, POC 0.2 mg/dL     Nitrite, Urine, POC Negative Negative    Leukocyte Esterase, Urine, POC Negative Negative       There were no vitals taken for this visit. GENERAL: NAD, ALERT, A&O x 3, GAIT NORMAL  LUNGS: easy work of breathing  ABDOMEN: soft, non tender  NEUROLOGIC: cranial nerves 2-12 grossly intact   : L side of penis + palpable plaque just deep to peno-mons junction        Assessment and Plan    ICD-10-CM    1. Peyronie's disease  N48.6 AMB POC URINALYSIS DIP STICK AUTO W/O MICRO        I discussed the patho-physiology of Peyronie's with pt. today. We then discussed treatment options including doing nothing, PO therapy, plaque injection, plication, and grafting procedures. Chances of success and risks of each were discussed. Pt. has decided to NO move forward with any type of treatment. I agree. I will see him prn.

## 2023-04-06 ENCOUNTER — OFFICE VISIT (OUTPATIENT)
Dept: CARDIOLOGY CLINIC | Age: 53
End: 2023-04-06
Payer: COMMERCIAL

## 2023-04-06 VITALS
HEART RATE: 79 BPM | BODY MASS INDEX: 27.84 KG/M2 | HEIGHT: 70 IN | SYSTOLIC BLOOD PRESSURE: 124 MMHG | WEIGHT: 194.5 LBS | DIASTOLIC BLOOD PRESSURE: 82 MMHG

## 2023-04-06 DIAGNOSIS — I10 ESSENTIAL HYPERTENSION: ICD-10-CM

## 2023-04-06 DIAGNOSIS — N52.2 DRUG-INDUCED ERECTILE DYSFUNCTION: Chronic | ICD-10-CM

## 2023-04-06 DIAGNOSIS — I25.10 CORONARY ARTERY DISEASE INVOLVING NATIVE CORONARY ARTERY OF NATIVE HEART WITHOUT ANGINA PECTORIS: Primary | ICD-10-CM

## 2023-04-06 DIAGNOSIS — E78.5 DYSLIPIDEMIA: ICD-10-CM

## 2023-04-06 PROBLEM — R07.9 CHEST PAIN: Status: RESOLVED | Noted: 2022-01-03 | Resolved: 2023-04-06

## 2023-04-06 PROBLEM — R07.2 PRECORDIAL PAIN: Status: RESOLVED | Noted: 2021-05-05 | Resolved: 2023-04-06

## 2023-04-06 PROCEDURE — 99214 OFFICE O/P EST MOD 30 MIN: CPT | Performed by: INTERNAL MEDICINE

## 2023-04-06 PROCEDURE — 93000 ELECTROCARDIOGRAM COMPLETE: CPT | Performed by: INTERNAL MEDICINE

## 2023-04-06 PROCEDURE — 3079F DIAST BP 80-89 MM HG: CPT | Performed by: INTERNAL MEDICINE

## 2023-04-06 PROCEDURE — 3074F SYST BP LT 130 MM HG: CPT | Performed by: INTERNAL MEDICINE

## 2023-04-06 RX ORDER — ROSUVASTATIN CALCIUM 40 MG/1
40 TABLET, COATED ORAL DAILY
Qty: 90 TABLET | Refills: 3 | Status: SHIPPED | OUTPATIENT
Start: 2023-04-06

## 2023-04-06 RX ORDER — SILDENAFIL 100 MG/1
100 TABLET, FILM COATED ORAL DAILY PRN
Qty: 30 TABLET | Refills: 0 | Status: SHIPPED | OUTPATIENT
Start: 2023-04-06

## 2023-04-06 ASSESSMENT — ENCOUNTER SYMPTOMS
ABDOMINAL PAIN: 0
BACK PAIN: 0
COUGH: 0
SHORTNESS OF BREATH: 0

## 2023-04-06 NOTE — PROGRESS NOTES
Component Value Date    LDLCALC 66.6 01/04/2022    LDLCALC 67.4 09/01/2021     Lab Results   Component Value Date    LABVLDL 26.4 (H) 01/04/2022    LABVLDL 24.6 (H) 09/01/2021     Lab Results   Component Value Date    CHOLHDLRATIO 2.8 01/04/2022    CHOLHDLRATIO 2.6 09/01/2021        No results found for: LABA1C  No results found for: EAG     Lab Results   Component Value Date     01/04/2022    K 4.0 01/04/2022     01/04/2022    CO2 27 01/04/2022    BUN 12 01/04/2022    CREATININE 0.96 01/04/2022    GLUCOSE 117 (H) 01/04/2022    CALCIUM 9.0 01/04/2022    PROT 7.7 01/03/2022    LABALBU 4.2 01/03/2022    BILITOT 0.7 01/03/2022    ALKPHOS 66 01/03/2022    AST 29 01/03/2022    ALT 54 01/03/2022    LABGLOM >60 05/17/2021    GFRAA >60 01/04/2022    AGRATIO 1.2 01/03/2022    GLOB 3.5 01/03/2022       Lab Results   Component Value Date/Time     01/04/2022 05:11 AM    K 4.0 01/04/2022 05:11 AM     01/04/2022 05:11 AM    CO2 27 01/04/2022 05:11 AM    BUN 12 01/04/2022 05:11 AM    CREATININE 0.96 01/04/2022 05:11 AM    GLUCOSE 117 01/04/2022 05:11 AM    CALCIUM 9.0 01/04/2022 05:11 AM        Lab Results   Component Value Date    WBC 4.7 01/04/2022    HGB 14.0 01/04/2022    HCT 40.0 (L) 01/04/2022    MCV 92.8 01/04/2022     (L) 01/04/2022             ASSESSMENT:     Diagnoses and all orders for this visit:      Coronary artery disease - Moderate mLAD with DFR 0.96 and minimal CAD otherwise. Dyslipidemia - 03/2023:  LDL 89. Continue rosuvastatin 40mg daily. If remains > 70 will need to add Zetia 10mg daily.         Essential hypertension - BP well controlled on lisinopril 10mg daily      Erectile dysfunction - On Viagra       Problem List Items Addressed This Visit          Circulatory    Essential hypertension    Coronary artery disease involving native coronary artery of native heart without angina pectoris - Primary    Relevant Medications    rosuvastatin (CRESTOR) 40 MG tablet

## 2023-05-16 DIAGNOSIS — E78.5 DYSLIPIDEMIA: ICD-10-CM

## 2023-05-16 RX ORDER — ROSUVASTATIN CALCIUM 40 MG/1
TABLET, COATED ORAL
Qty: 90 TABLET | Refills: 3 | Status: SHIPPED | OUTPATIENT
Start: 2023-05-16

## 2023-07-02 ENCOUNTER — PATIENT MESSAGE (OUTPATIENT)
Age: 53
End: 2023-07-02

## 2023-07-02 DIAGNOSIS — E78.5 DYSLIPIDEMIA: ICD-10-CM

## 2023-07-03 NOTE — TELEPHONE ENCOUNTER
From: Misti Mast. To: Dr. Sondra Rhodes: 7/2/2023 7:52 AM EDT  Subject: meds    I am in need of my of my Rosuvastatin. Can you call that in to [id number] [facility] on [address] [place]

## 2023-07-05 RX ORDER — ROSUVASTATIN CALCIUM 40 MG/1
40 TABLET, COATED ORAL NIGHTLY
Qty: 90 TABLET | Refills: 3 | Status: SHIPPED | OUTPATIENT
Start: 2023-07-05

## 2023-10-13 ENCOUNTER — OFFICE VISIT (OUTPATIENT)
Age: 53
End: 2023-10-13
Payer: COMMERCIAL

## 2023-10-13 VITALS
WEIGHT: 194 LBS | HEIGHT: 70 IN | BODY MASS INDEX: 27.77 KG/M2 | SYSTOLIC BLOOD PRESSURE: 120 MMHG | DIASTOLIC BLOOD PRESSURE: 86 MMHG | HEART RATE: 80 BPM

## 2023-10-13 DIAGNOSIS — I25.10 CORONARY ARTERY DISEASE INVOLVING NATIVE CORONARY ARTERY OF NATIVE HEART WITHOUT ANGINA PECTORIS: Primary | ICD-10-CM

## 2023-10-13 DIAGNOSIS — N52.2 DRUG-INDUCED ERECTILE DYSFUNCTION: Chronic | ICD-10-CM

## 2023-10-13 DIAGNOSIS — I10 ESSENTIAL HYPERTENSION: ICD-10-CM

## 2023-10-13 DIAGNOSIS — E78.5 DYSLIPIDEMIA: ICD-10-CM

## 2023-10-13 PROCEDURE — 3074F SYST BP LT 130 MM HG: CPT | Performed by: INTERNAL MEDICINE

## 2023-10-13 PROCEDURE — 99214 OFFICE O/P EST MOD 30 MIN: CPT | Performed by: INTERNAL MEDICINE

## 2023-10-13 PROCEDURE — 3079F DIAST BP 80-89 MM HG: CPT | Performed by: INTERNAL MEDICINE

## 2023-10-13 RX ORDER — LISINOPRIL 10 MG/1
10 TABLET ORAL DAILY
Qty: 90 TABLET | Refills: 3 | Status: SHIPPED | OUTPATIENT
Start: 2023-10-13

## 2023-10-13 ASSESSMENT — ENCOUNTER SYMPTOMS
ABDOMINAL PAIN: 0
SHORTNESS OF BREATH: 0
COUGH: 0
BACK PAIN: 0

## 2023-10-13 NOTE — PROGRESS NOTES
Orders    Lipid Panel    Comprehensive Metabolic Panel    CBC       Other    Drug-induced erectile dysfunction (Chronic)    Dyslipidemia    Relevant Orders    Lipid Panel    Comprehensive Metabolic Panel    CBC     Medications Discontinued During This Encounter   Medication Reason    lisinopril (PRINIVIL;ZESTRIL) 10 MG tablet REORDER             Patient has been instructed and agrees to call our office with any issues or other concerns related to their cardiac condition(s) and/or complaint(s). Return in about 6 months (around 4/13/2024).        Ann Holley MD  10/13/2023

## 2024-09-16 ENCOUNTER — OFFICE VISIT (OUTPATIENT)
Age: 54
End: 2024-09-16
Payer: COMMERCIAL

## 2024-09-16 VITALS
WEIGHT: 191 LBS | SYSTOLIC BLOOD PRESSURE: 124 MMHG | HEART RATE: 65 BPM | HEIGHT: 70 IN | DIASTOLIC BLOOD PRESSURE: 88 MMHG | BODY MASS INDEX: 27.35 KG/M2

## 2024-09-16 DIAGNOSIS — I25.10 CORONARY ARTERY DISEASE INVOLVING NATIVE CORONARY ARTERY OF NATIVE HEART WITHOUT ANGINA PECTORIS: Primary | ICD-10-CM

## 2024-09-16 DIAGNOSIS — E78.5 DYSLIPIDEMIA: ICD-10-CM

## 2024-09-16 DIAGNOSIS — I10 ESSENTIAL HYPERTENSION: ICD-10-CM

## 2024-09-16 DIAGNOSIS — R55 VASOVAGAL SYNCOPE: ICD-10-CM

## 2024-09-16 DIAGNOSIS — R55 SYNCOPE AND COLLAPSE: Chronic | ICD-10-CM

## 2024-09-16 PROCEDURE — 3079F DIAST BP 80-89 MM HG: CPT | Performed by: INTERNAL MEDICINE

## 2024-09-16 PROCEDURE — 3074F SYST BP LT 130 MM HG: CPT | Performed by: INTERNAL MEDICINE

## 2024-09-16 PROCEDURE — 93000 ELECTROCARDIOGRAM COMPLETE: CPT | Performed by: INTERNAL MEDICINE

## 2024-09-16 PROCEDURE — 99214 OFFICE O/P EST MOD 30 MIN: CPT | Performed by: INTERNAL MEDICINE

## 2024-09-16 ASSESSMENT — ENCOUNTER SYMPTOMS
ABDOMINAL PAIN: 0
COUGH: 0
SHORTNESS OF BREATH: 0
BACK PAIN: 0

## 2024-11-23 ENCOUNTER — PATIENT MESSAGE (OUTPATIENT)
Age: 54
End: 2024-11-23

## 2024-11-25 RX ORDER — LISINOPRIL 10 MG/1
10 TABLET ORAL DAILY
Qty: 90 TABLET | Refills: 3 | Status: SHIPPED | OUTPATIENT
Start: 2024-11-25

## 2025-03-31 ENCOUNTER — PATIENT MESSAGE (OUTPATIENT)
Age: 55
End: 2025-03-31

## 2025-03-31 DIAGNOSIS — E78.5 DYSLIPIDEMIA: ICD-10-CM

## 2025-04-01 RX ORDER — ROSUVASTATIN CALCIUM 40 MG/1
40 TABLET, COATED ORAL NIGHTLY
Qty: 90 TABLET | Refills: 3 | Status: SHIPPED | OUTPATIENT
Start: 2025-04-01

## 2025-05-19 ENCOUNTER — PATIENT MESSAGE (OUTPATIENT)
Age: 55
End: 2025-05-19

## 2025-05-19 DIAGNOSIS — I25.10 CORONARY ARTERY DISEASE INVOLVING NATIVE CORONARY ARTERY OF NATIVE HEART WITHOUT ANGINA PECTORIS: ICD-10-CM

## 2025-05-19 DIAGNOSIS — E78.5 DYSLIPIDEMIA: ICD-10-CM

## 2025-05-19 RX ORDER — ROSUVASTATIN CALCIUM 40 MG/1
40 TABLET, COATED ORAL NIGHTLY
Qty: 90 TABLET | Refills: 3 | Status: SHIPPED
Start: 2025-05-19 | End: 2025-05-19 | Stop reason: SDUPTHER

## 2025-05-19 RX ORDER — LISINOPRIL 10 MG/1
10 TABLET ORAL DAILY
Qty: 90 TABLET | Refills: 3 | Status: SHIPPED
Start: 2025-05-19 | End: 2025-05-19 | Stop reason: SDUPTHER

## 2025-05-19 RX ORDER — ROSUVASTATIN CALCIUM 40 MG/1
40 TABLET, COATED ORAL NIGHTLY
Qty: 90 TABLET | Refills: 3 | Status: SHIPPED | OUTPATIENT
Start: 2025-05-19

## 2025-05-19 RX ORDER — SILDENAFIL 100 MG/1
100 TABLET, FILM COATED ORAL DAILY PRN
Qty: 30 TABLET | Refills: 5 | Status: SHIPPED | OUTPATIENT
Start: 2025-05-19

## 2025-05-19 RX ORDER — LISINOPRIL 10 MG/1
10 TABLET ORAL DAILY
Qty: 90 TABLET | Refills: 3 | Status: SHIPPED | OUTPATIENT
Start: 2025-05-19

## (undated) DEVICE — BAND RADIAL COMPR ARTERY 24CM -- REG BX/10

## (undated) DEVICE — CATHETER GUID AD 6FR L100CM COR PERIPH GRN NYL PTFE XB L 3

## (undated) DEVICE — CATHETER DIAG SM AD 6FR L100CM 0.038IN COR POLYUR JUDKINS L

## (undated) DEVICE — CATHETER VASC 6 FR DIAG PGTL W/ SIDE H COR 110 CM LEN W/OUT

## (undated) DEVICE — GUIDEWIRE 035IN 210CM PTFE COAT FIX COR J TIP 15MM FIRM BODY

## (undated) DEVICE — RADIFOCUS OPTITORQUE ANGIOGRAPHIC CATHETER: Brand: OPTITORQUE

## (undated) DEVICE — PRESSURE GUIDEWIRE: Brand: COMET™ II

## (undated) DEVICE — GLIDESHEATH SLENDER STAINLESS STEEL KIT: Brand: GLIDESHEATH SLENDER

## (undated) DEVICE — COPILOT BLEEDBACK CONTROL VALVE: Brand: COPILOT